# Patient Record
Sex: FEMALE | Race: WHITE | NOT HISPANIC OR LATINO
[De-identification: names, ages, dates, MRNs, and addresses within clinical notes are randomized per-mention and may not be internally consistent; named-entity substitution may affect disease eponyms.]

---

## 2017-04-12 ENCOUNTER — APPOINTMENT (OUTPATIENT)
Dept: DERMATOLOGY | Facility: CLINIC | Age: 57
End: 2017-04-12

## 2017-04-12 DIAGNOSIS — J30.1 ALLERGIC RHINITIS DUE TO POLLEN: ICD-10-CM

## 2017-04-12 DIAGNOSIS — L23.7 ALLERGIC CONTACT DERMATITIS DUE TO PLANTS, EXCEPT FOOD: ICD-10-CM

## 2017-04-12 DIAGNOSIS — Z87.39 PERSONAL HISTORY OF OTHER DISEASES OF THE MUSCULOSKELETAL SYSTEM AND CONNECTIVE TISSUE: ICD-10-CM

## 2017-04-12 RX ORDER — CEVIMELINE HYDROCHLORIDE 30 MG/1
30 CAPSULE ORAL
Qty: 90 | Refills: 0 | Status: ACTIVE | COMMUNITY
Start: 2016-11-22

## 2018-04-03 ENCOUNTER — OUTPATIENT (OUTPATIENT)
Dept: OUTPATIENT SERVICES | Facility: HOSPITAL | Age: 58
LOS: 1 days | End: 2018-04-03
Payer: COMMERCIAL

## 2018-04-03 ENCOUNTER — APPOINTMENT (OUTPATIENT)
Dept: CT IMAGING | Facility: CLINIC | Age: 58
End: 2018-04-03
Payer: COMMERCIAL

## 2018-04-03 DIAGNOSIS — Z00.8 ENCOUNTER FOR OTHER GENERAL EXAMINATION: ICD-10-CM

## 2018-04-03 PROCEDURE — 71250 CT THORAX DX C-: CPT

## 2018-04-03 PROCEDURE — 71250 CT THORAX DX C-: CPT | Mod: 26

## 2019-10-14 ENCOUNTER — APPOINTMENT (OUTPATIENT)
Dept: ORTHOPEDIC SURGERY | Facility: CLINIC | Age: 59
End: 2019-10-14
Payer: COMMERCIAL

## 2019-10-14 VITALS — BODY MASS INDEX: 26.5 KG/M2 | WEIGHT: 135 LBS | HEIGHT: 60 IN

## 2019-10-14 DIAGNOSIS — M41.9 SCOLIOSIS, UNSPECIFIED: ICD-10-CM

## 2019-10-14 DIAGNOSIS — M54.5 LOW BACK PAIN: ICD-10-CM

## 2019-10-14 DIAGNOSIS — M51.36 OTHER INTERVERTEBRAL DISC DEGENERATION, LUMBAR REGION: ICD-10-CM

## 2019-10-14 PROCEDURE — 72100 X-RAY EXAM L-S SPINE 2/3 VWS: CPT

## 2019-10-14 NOTE — DISCUSSION/SUMMARY
[de-identified] : lumbar degenerative disc disease\par discussed options\par PT and patient wants MRI lumbar\par F/U after MRI\par lumbar brochure provided\par All options were discussed including rest, medicine, chiropractor, acupuncture, , PT , pain management, and last resort surgery. \par All questions were answered, all alternatives were discussed and the patient is in complete agreement with that plan. Follow-up appointment as instructed. Any issues and the patient will call or come in sooner.\par

## 2019-10-14 NOTE — HISTORY OF PRESENT ILLNESS
[Stable] : stable [de-identified] : 59 year old female\par LBP with scoliosis.\par Has had LBP since late 2017 \par in 30's found out she has degenerative disc disease \par Has not done injections for the pain. has undergone PT course in 2006 without relief\par CT scan for pelvic pain 12/2018\par no fever, chills, sweats, nausea vomiting no bowel movement or bladder dysfunction, no recent weight loss or gain no night pain. the history is in addition to the intake form i personally reviewed.\par

## 2019-10-14 NOTE — PHYSICAL EXAM
[Normal] : Gait: normal [SLR] : negative straight leg raise [de-identified] : 5 out of 5 motor strength, sensation is intact and symmetrical full range of motion flexion extension and rotation, no palpatory tenderness full range of motion of hips knees shoulders and elbows, no atrophy, negative straight leg raise, no pathological reflexes, no swelling, normal ambulation, no apparent distress, skin is intact, no palpable lymph nodes, no upper or lower extremity instability, alert and oriented x3 and normal mood. normal finger to nose test.\par  [de-identified] : AP/lat lumbar-scoliosis and degenerative changes-reviewed with the patient.

## 2019-10-14 NOTE — ADDENDUM
[FreeTextEntry1] : I, Forrest Holguin, acted solely as a scribe for Dr. Zelaya on 10/14/2019  .\par  \par All medical record entries made by the scribe were at my, Dr. Zelaya, direction and personally dictated by me on 10/14/2019. I have reviewed the chart and agree that the record accurately reflects my personal performance of the history, physical exam, assessment and plan. I have also personally directed, reviewed, and agreed with the chart.\par

## 2020-12-31 ENCOUNTER — APPOINTMENT (OUTPATIENT)
Dept: OTOLARYNGOLOGY | Facility: CLINIC | Age: 60
End: 2020-12-31
Payer: COMMERCIAL

## 2020-12-31 VITALS
SYSTOLIC BLOOD PRESSURE: 151 MMHG | WEIGHT: 135 LBS | BODY MASS INDEX: 26.5 KG/M2 | DIASTOLIC BLOOD PRESSURE: 93 MMHG | HEIGHT: 60 IN | TEMPERATURE: 98.3 F | HEART RATE: 92 BPM

## 2020-12-31 PROCEDURE — 99072 ADDL SUPL MATRL&STAF TM PHE: CPT

## 2020-12-31 PROCEDURE — 99204 OFFICE O/P NEW MOD 45 MIN: CPT

## 2020-12-31 NOTE — REVIEW OF SYSTEMS
[Ear Pain] : ear pain [Dizziness] : dizziness [Vertigo] : vertigo [Lightheadedness] : lightheadedness [Nose Bleeds] : nose bleeds [Problem Snoring] : problem snoring [Sinus Pain] : sinus pain [Sinus Pressure] : sinus pressure [Sense Of Smell Problem] : sense of smell problem [Swelling Face] : face swelling [Heartburn] : heartburn [Joint Pain] : joint pain [Anxiety] : anxiety [Swollen Glands] : swollen glands [Negative] : Endocrine [Patient Intake Form Reviewed] : Patient intake form was reviewed

## 2020-12-31 NOTE — ASSESSMENT
[FreeTextEntry1] : acute parotitis\par question of prior swelling parotid\par complete augmentin\par pred 10 #20\par US parotid\par fu 2 weeks

## 2020-12-31 NOTE — HISTORY OF PRESENT ILLNESS
[de-identified] : 10 days rt check swelling and painful\par to urgent care rx augmentin\par hx sjogrens and oral sicca w mild gland swelling

## 2021-01-01 ENCOUNTER — TRANSCRIPTION ENCOUNTER (OUTPATIENT)
Age: 61
End: 2021-01-01

## 2021-01-02 ENCOUNTER — OUTPATIENT (OUTPATIENT)
Dept: OUTPATIENT SERVICES | Facility: HOSPITAL | Age: 61
LOS: 1 days | End: 2021-01-02
Payer: COMMERCIAL

## 2021-01-02 ENCOUNTER — APPOINTMENT (OUTPATIENT)
Dept: ULTRASOUND IMAGING | Facility: CLINIC | Age: 61
End: 2021-01-02
Payer: COMMERCIAL

## 2021-01-02 DIAGNOSIS — Z00.8 ENCOUNTER FOR OTHER GENERAL EXAMINATION: ICD-10-CM

## 2021-01-02 PROCEDURE — 76536 US EXAM OF HEAD AND NECK: CPT | Mod: 26

## 2021-01-02 PROCEDURE — 76536 US EXAM OF HEAD AND NECK: CPT

## 2021-01-05 ENCOUNTER — NON-APPOINTMENT (OUTPATIENT)
Age: 61
End: 2021-01-05

## 2021-01-21 ENCOUNTER — NON-APPOINTMENT (OUTPATIENT)
Age: 61
End: 2021-01-21

## 2021-01-21 ENCOUNTER — APPOINTMENT (OUTPATIENT)
Dept: OTOLARYNGOLOGY | Facility: CLINIC | Age: 61
End: 2021-01-21
Payer: COMMERCIAL

## 2021-01-21 VITALS — BODY MASS INDEX: 26.5 KG/M2 | HEIGHT: 60 IN | TEMPERATURE: 98.1 F | WEIGHT: 135 LBS

## 2021-01-21 DIAGNOSIS — M35.00 SICCA SYNDROME, UNSPECIFIED: ICD-10-CM

## 2021-01-21 DIAGNOSIS — K11.21 ACUTE SIALOADENITIS: ICD-10-CM

## 2021-01-21 PROCEDURE — 99213 OFFICE O/P EST LOW 20 MIN: CPT

## 2021-01-21 PROCEDURE — 99072 ADDL SUPL MATRL&STAF TM PHE: CPT

## 2021-01-21 NOTE — PHYSICAL EXAM
[Midline] : trachea located in midline position [Normal] : salivary glands are normal [de-identified] : mild soft swelling rt parotid good flow saliva howard duct

## 2021-01-21 NOTE — ASSESSMENT
[FreeTextEntry1] : oral sicca\par resolved parotitis\par sjogrens\par trial pilocarpine 5 mg qid prn

## 2021-12-30 ENCOUNTER — OUTPATIENT (OUTPATIENT)
Dept: OUTPATIENT SERVICES | Facility: HOSPITAL | Age: 61
LOS: 1 days | End: 2021-12-30
Payer: COMMERCIAL

## 2021-12-30 ENCOUNTER — APPOINTMENT (OUTPATIENT)
Dept: ULTRASOUND IMAGING | Facility: CLINIC | Age: 61
End: 2021-12-30
Payer: COMMERCIAL

## 2021-12-30 ENCOUNTER — APPOINTMENT (OUTPATIENT)
Dept: MAMMOGRAPHY | Facility: CLINIC | Age: 61
End: 2021-12-30
Payer: COMMERCIAL

## 2021-12-30 DIAGNOSIS — R92.2 INCONCLUSIVE MAMMOGRAM: ICD-10-CM

## 2021-12-30 DIAGNOSIS — Z12.31 ENCOUNTER FOR SCREENING MAMMOGRAM FOR MALIGNANT NEOPLASM OF BREAST: ICD-10-CM

## 2021-12-30 DIAGNOSIS — Z00.8 ENCOUNTER FOR OTHER GENERAL EXAMINATION: ICD-10-CM

## 2021-12-30 PROCEDURE — 77063 BREAST TOMOSYNTHESIS BI: CPT

## 2021-12-30 PROCEDURE — 77067 SCR MAMMO BI INCL CAD: CPT

## 2021-12-30 PROCEDURE — 77067 SCR MAMMO BI INCL CAD: CPT | Mod: 26

## 2021-12-30 PROCEDURE — 76641 ULTRASOUND BREAST COMPLETE: CPT | Mod: 26,50

## 2021-12-30 PROCEDURE — 77063 BREAST TOMOSYNTHESIS BI: CPT | Mod: 26

## 2021-12-30 PROCEDURE — 76641 ULTRASOUND BREAST COMPLETE: CPT

## 2022-02-01 ENCOUNTER — APPOINTMENT (OUTPATIENT)
Dept: ULTRASOUND IMAGING | Facility: CLINIC | Age: 62
End: 2022-02-01
Payer: COMMERCIAL

## 2022-02-01 ENCOUNTER — OUTPATIENT (OUTPATIENT)
Dept: OUTPATIENT SERVICES | Facility: HOSPITAL | Age: 62
LOS: 1 days | End: 2022-02-01
Payer: COMMERCIAL

## 2022-02-01 ENCOUNTER — APPOINTMENT (OUTPATIENT)
Dept: MAMMOGRAPHY | Facility: CLINIC | Age: 62
End: 2022-02-01
Payer: COMMERCIAL

## 2022-02-01 DIAGNOSIS — Z00.8 ENCOUNTER FOR OTHER GENERAL EXAMINATION: ICD-10-CM

## 2022-02-01 PROCEDURE — 77065 DX MAMMO INCL CAD UNI: CPT | Mod: 26,RT

## 2022-02-01 PROCEDURE — 76700 US EXAM ABDOM COMPLETE: CPT | Mod: 26

## 2022-02-01 PROCEDURE — 76700 US EXAM ABDOM COMPLETE: CPT

## 2022-02-01 PROCEDURE — 76642 ULTRASOUND BREAST LIMITED: CPT | Mod: 26,RT

## 2022-02-01 PROCEDURE — 76642 ULTRASOUND BREAST LIMITED: CPT

## 2022-02-01 PROCEDURE — 77065 DX MAMMO INCL CAD UNI: CPT

## 2022-02-22 ENCOUNTER — APPOINTMENT (OUTPATIENT)
Dept: MRI IMAGING | Facility: CLINIC | Age: 62
End: 2022-02-22

## 2022-02-24 ENCOUNTER — APPOINTMENT (OUTPATIENT)
Dept: MRI IMAGING | Facility: CLINIC | Age: 62
End: 2022-02-24
Payer: COMMERCIAL

## 2022-02-24 ENCOUNTER — OUTPATIENT (OUTPATIENT)
Dept: OUTPATIENT SERVICES | Facility: HOSPITAL | Age: 62
LOS: 1 days | End: 2022-02-24
Payer: COMMERCIAL

## 2022-02-24 DIAGNOSIS — Z00.8 ENCOUNTER FOR OTHER GENERAL EXAMINATION: ICD-10-CM

## 2022-02-24 PROCEDURE — A9585: CPT

## 2022-02-24 PROCEDURE — 74183 MRI ABD W/O CNTR FLWD CNTR: CPT | Mod: 26

## 2022-02-24 PROCEDURE — 74183 MRI ABD W/O CNTR FLWD CNTR: CPT

## 2022-06-09 ENCOUNTER — APPOINTMENT (OUTPATIENT)
Dept: ORTHOPEDIC SURGERY | Facility: CLINIC | Age: 62
End: 2022-06-09

## 2022-06-09 DIAGNOSIS — S52.122A DISPLACED FRACTURE OF HEAD OF LEFT RADIUS, INITIAL ENCOUNTER FOR CLOSED FRACTURE: ICD-10-CM

## 2022-06-09 PROCEDURE — 73080 X-RAY EXAM OF ELBOW: CPT | Mod: LT

## 2022-06-09 PROCEDURE — 24650 CLTX RDL HEAD/NCK FX WO MNPJ: CPT | Mod: LT

## 2022-06-09 PROCEDURE — 99204 OFFICE O/P NEW MOD 45 MIN: CPT | Mod: 1L,57

## 2022-06-09 PROCEDURE — 73110 X-RAY EXAM OF WRIST: CPT | Mod: RT

## 2022-06-24 ENCOUNTER — INPATIENT (INPATIENT)
Facility: HOSPITAL | Age: 62
LOS: 0 days | Discharge: HOME | DRG: 502 | End: 2022-06-24
Attending: ORTHOPAEDIC SURGERY | Admitting: ORTHOPAEDIC SURGERY
Payer: COMMERCIAL

## 2022-06-24 ENCOUNTER — APPOINTMENT (OUTPATIENT)
Dept: ORTHOPEDIC SURGERY | Facility: CLINIC | Age: 62
End: 2022-06-24

## 2022-06-24 ENCOUNTER — TRANSCRIPTION ENCOUNTER (OUTPATIENT)
Age: 62
End: 2022-06-24

## 2022-06-24 ENCOUNTER — RESULT REVIEW (OUTPATIENT)
Age: 62
End: 2022-06-24

## 2022-06-24 VITALS — HEIGHT: 61 IN | WEIGHT: 125 LBS

## 2022-06-24 VITALS
OXYGEN SATURATION: 96 % | DIASTOLIC BLOOD PRESSURE: 71 MMHG | RESPIRATION RATE: 16 BRPM | HEART RATE: 75 BPM | SYSTOLIC BLOOD PRESSURE: 131 MMHG

## 2022-06-24 DIAGNOSIS — S42.409A UNSPECIFIED FRACTURE OF LOWER END OF UNSPECIFIED HUMERUS, INITIAL ENCOUNTER FOR CLOSED FRACTURE: ICD-10-CM

## 2022-06-24 DIAGNOSIS — S52.122A DISPLACED FRACTURE OF HEAD OF LEFT RADIUS, INITIAL ENCOUNTER FOR CLOSED FRACTURE: ICD-10-CM

## 2022-06-24 LAB
ANION GAP SERPL CALC-SCNC: 5 MMOL/L — SIGNIFICANT CHANGE UP (ref 5–17)
APTT BLD: 32.9 SEC — SIGNIFICANT CHANGE UP (ref 27.5–35.5)
BUN SERPL-MCNC: 24 MG/DL — HIGH (ref 7–23)
CALCIUM SERPL-MCNC: 9.2 MG/DL — SIGNIFICANT CHANGE UP (ref 8.5–10.1)
CHLORIDE SERPL-SCNC: 110 MMOL/L — HIGH (ref 96–108)
CO2 SERPL-SCNC: 26 MMOL/L — SIGNIFICANT CHANGE UP (ref 22–31)
CREAT SERPL-MCNC: 0.93 MG/DL — SIGNIFICANT CHANGE UP (ref 0.5–1.3)
EGFR: 69 ML/MIN/1.73M2 — SIGNIFICANT CHANGE UP
GLUCOSE SERPL-MCNC: 88 MG/DL — SIGNIFICANT CHANGE UP (ref 70–99)
HCG SERPL-ACNC: 4 MIU/ML — SIGNIFICANT CHANGE UP
HCT VFR BLD CALC: 42.4 % — SIGNIFICANT CHANGE UP (ref 34.5–45)
HGB BLD-MCNC: 13.7 G/DL — SIGNIFICANT CHANGE UP (ref 11.5–15.5)
INR BLD: 1.06 RATIO — SIGNIFICANT CHANGE UP (ref 0.88–1.16)
MCHC RBC-ENTMCNC: 28.7 PG — SIGNIFICANT CHANGE UP (ref 27–34)
MCHC RBC-ENTMCNC: 32.3 GM/DL — SIGNIFICANT CHANGE UP (ref 32–36)
MCV RBC AUTO: 88.7 FL — SIGNIFICANT CHANGE UP (ref 80–100)
PLATELET # BLD AUTO: 175 K/UL — SIGNIFICANT CHANGE UP (ref 150–400)
POTASSIUM SERPL-MCNC: 4.1 MMOL/L — SIGNIFICANT CHANGE UP (ref 3.5–5.3)
POTASSIUM SERPL-SCNC: 4.1 MMOL/L — SIGNIFICANT CHANGE UP (ref 3.5–5.3)
PROTHROM AB SERPL-ACNC: 12.3 SEC — SIGNIFICANT CHANGE UP (ref 10.5–13.4)
RBC # BLD: 4.78 M/UL — SIGNIFICANT CHANGE UP (ref 3.8–5.2)
RBC # FLD: 12.4 % — SIGNIFICANT CHANGE UP (ref 10.3–14.5)
SARS-COV-2 RNA SPEC QL NAA+PROBE: SIGNIFICANT CHANGE UP
SODIUM SERPL-SCNC: 141 MMOL/L — SIGNIFICANT CHANGE UP (ref 135–145)
WBC # BLD: 6.37 K/UL — SIGNIFICANT CHANGE UP (ref 3.8–10.5)
WBC # FLD AUTO: 6.37 K/UL — SIGNIFICANT CHANGE UP (ref 3.8–10.5)

## 2022-06-24 PROCEDURE — 76000 FLUOROSCOPY <1 HR PHYS/QHP: CPT

## 2022-06-24 PROCEDURE — 73090 X-RAY EXAM OF FOREARM: CPT | Mod: LT

## 2022-06-24 PROCEDURE — 73070 X-RAY EXAM OF ELBOW: CPT | Mod: 26,LT

## 2022-06-24 PROCEDURE — 93010 ELECTROCARDIOGRAM REPORT: CPT

## 2022-06-24 PROCEDURE — 73110 X-RAY EXAM OF WRIST: CPT | Mod: 26,LT

## 2022-06-24 PROCEDURE — 88307 TISSUE EXAM BY PATHOLOGIST: CPT | Mod: 26

## 2022-06-24 PROCEDURE — 88307 TISSUE EXAM BY PATHOLOGIST: CPT

## 2022-06-24 PROCEDURE — 88311 DECALCIFY TISSUE: CPT

## 2022-06-24 PROCEDURE — 99285 EMERGENCY DEPT VISIT HI MDM: CPT

## 2022-06-24 PROCEDURE — 71045 X-RAY EXAM CHEST 1 VIEW: CPT | Mod: 26

## 2022-06-24 PROCEDURE — 73200 CT UPPER EXTREMITY W/O DYE: CPT | Mod: 26,LT,MA

## 2022-06-24 PROCEDURE — 71045 X-RAY EXAM CHEST 1 VIEW: CPT

## 2022-06-24 PROCEDURE — 88311 DECALCIFY TISSUE: CPT | Mod: 26

## 2022-06-24 PROCEDURE — 73090 X-RAY EXAM OF FOREARM: CPT | Mod: 26,LT

## 2022-06-24 PROCEDURE — C1776: CPT

## 2022-06-24 PROCEDURE — 76376 3D RENDER W/INTRP POSTPROCES: CPT | Mod: 26

## 2022-06-24 PROCEDURE — 73070 X-RAY EXAM OF ELBOW: CPT | Mod: LT

## 2022-06-24 PROCEDURE — 73110 X-RAY EXAM OF WRIST: CPT | Mod: LT

## 2022-06-24 RX ORDER — ACETAMINOPHEN 500 MG
650 TABLET ORAL EVERY 6 HOURS
Refills: 0 | Status: DISCONTINUED | OUTPATIENT
Start: 2022-06-24 | End: 2022-06-24

## 2022-06-24 RX ORDER — OXYCODONE HYDROCHLORIDE 5 MG/1
5 TABLET ORAL EVERY 4 HOURS
Refills: 0 | Status: DISCONTINUED | OUTPATIENT
Start: 2022-06-24 | End: 2022-06-24

## 2022-06-24 RX ORDER — FENTANYL CITRATE 50 UG/ML
50 INJECTION INTRAVENOUS
Refills: 0 | Status: DISCONTINUED | OUTPATIENT
Start: 2022-06-24 | End: 2022-06-24

## 2022-06-24 RX ORDER — OXYCODONE HYDROCHLORIDE 5 MG/1
10 TABLET ORAL EVERY 4 HOURS
Refills: 0 | Status: DISCONTINUED | OUTPATIENT
Start: 2022-06-24 | End: 2022-06-24

## 2022-06-24 RX ORDER — OXYCODONE AND ACETAMINOPHEN 5; 325 MG/1; MG/1
1 TABLET ORAL
Qty: 30 | Refills: 0
Start: 2022-06-24

## 2022-06-24 RX ORDER — ONDANSETRON 8 MG/1
1 TABLET, FILM COATED ORAL
Qty: 21 | Refills: 0
Start: 2022-06-24 | End: 2022-06-30

## 2022-06-24 RX ORDER — SODIUM CHLORIDE 9 MG/ML
1000 INJECTION, SOLUTION INTRAVENOUS
Refills: 0 | Status: DISCONTINUED | OUTPATIENT
Start: 2022-06-24 | End: 2022-06-24

## 2022-06-24 RX ORDER — OXYCODONE HYDROCHLORIDE 5 MG/1
5 TABLET ORAL ONCE
Refills: 0 | Status: DISCONTINUED | OUTPATIENT
Start: 2022-06-24 | End: 2022-06-24

## 2022-06-24 RX ORDER — DOCUSATE SODIUM 100 MG
1 CAPSULE ORAL
Qty: 21 | Refills: 0
Start: 2022-06-24 | End: 2022-06-30

## 2022-06-24 RX ORDER — ONDANSETRON 8 MG/1
4 TABLET, FILM COATED ORAL EVERY 6 HOURS
Refills: 0 | Status: DISCONTINUED | OUTPATIENT
Start: 2022-06-24 | End: 2022-06-24

## 2022-06-24 RX ADMIN — FENTANYL CITRATE 50 MICROGRAM(S): 50 INJECTION INTRAVENOUS at 18:19

## 2022-06-24 RX ADMIN — ONDANSETRON 4 MILLIGRAM(S): 8 TABLET, FILM COATED ORAL at 20:01

## 2022-06-24 RX ADMIN — OXYCODONE HYDROCHLORIDE 5 MILLIGRAM(S): 5 TABLET ORAL at 18:44

## 2022-06-24 RX ADMIN — SODIUM CHLORIDE 75 MILLILITER(S): 9 INJECTION, SOLUTION INTRAVENOUS at 12:40

## 2022-06-24 NOTE — ASU DISCHARGE PLAN (ADULT/PEDIATRIC) - CARE PROVIDER_API CALL
Blaine Rodriguez)  Orthopaedic Surgery; Surgery of the Hand  [address] [place] [address]  Phone: [phone]  Fax: [phone]  Follow Up Time:

## 2022-06-24 NOTE — ED STATDOCS - PHYSICAL EXAMINATION
Constitutional: NAD AOx3  Eyes: PERRL EOMI  Head: Normocephalic atraumatic  Mouth: MMM  Cardiac: regular rate and rhythm  Resp: Lungs CTAB  GI: Abd s/nd/nt  Neuro: CN2-12 grossly intact, THOMPSON x 4  MSK: left hand has mild edema, left elbow TPP with decreased range of motion secondary to pain.   Skin: No visible rashes

## 2022-06-24 NOTE — H&P ADULT - ATTENDING COMMENTS
Patient seen and evaluated. Has significant left elbow fracture and ligamentous injury requiring immediate surgical repair.  Risks/benefits/alternatives reviewed. No guarantees or reassurances made or implied. Need for additional possible future procedures reviewed.

## 2022-06-24 NOTE — ED STATDOCS - OBJECTIVE STATEMENT
61 y/o F with PMHx of Sjogren's, presents to ED c/o elbow injury. Pt with fall on 05/27 walking to her car, broke her left elbow. Followed up with dr townsend [facility] this week ,scheduled for surgery today. Pt is right handed. Pt is covid vaccinated. Only complaints of left elbow pain. 61 y/o F with PMHx of Sjogren's, presents to ED c/o elbow injury. Pt with fall on 05/27 walking to her car, broke her left elbow. Followed up with dr townsend [facility] this week ,scheduled for surgery today. Pt is right handed. Pt is covid vaccinated. Only complaining of left elbow pain.

## 2022-06-24 NOTE — ED STATDOCS - NSICDXNOPASTSURGICALHX_GEN_ALL_ED
<-- Click to add NO significant Past Surgical History
Normal vision: sees adequately in most situations; can see medication labels, newsprint

## 2022-06-24 NOTE — ED STATDOCS - NS ED ROS FT
Constitutional: No fever or chills  Eyes: No visual changes  HEENT: No throat pain  CV: No chest pain  Resp: No SOB no cough  GI: No abd pain, nausea or vomiting  : No dysuria  MSK: +musculoskeletal pain, +left elbow pain  Skin: No rash  Neuro: No headache

## 2022-06-24 NOTE — ASU DISCHARGE PLAN (ADULT/PEDIATRIC) - ASU DC SPECIAL INSTRUCTIONSFT
Discharge Instructions     1. Activity: No Aggressive ROM until cleared by Dr. Rodriguez Maintain sling . Elevate hand and wiggle fingers. Do not start any outpatient PT until you see Dr. Rodriguez in the office.    2. Call with fever over 101, wound redness, drainage.    3. Wound care: Do not remove or change dressing unless saturated.  IF so, apply dry gauze, tegaderm. There are no sutures or staples to remove.    4. Continue to apply ICE packs.    6. Follow Up: Orthopedics, Dr. Rodriguez in office. Call to schedule. eRx sent to your pharmacy for .

## 2022-06-24 NOTE — DISCHARGE NOTE PROVIDER - HOSPITAL COURSE
The patient is a 62y Female status post left radial head replacement. Patient presented to [facility] for surgical procedure. The patient was taken to the operating room on 6/24/22. Prophylactic antibiotics were started before the procedure and continued for 24 hours. There were no complications during the procedure and patient tolerated the procedure well. The patient was transferred to the recovery room in stable condition. Patient was discharged from pacu to home in stable condition.

## 2022-06-24 NOTE — DISCHARGE NOTE PROVIDER - NSDCFUADDINST_GEN_ALL_CORE_FT
1. Activity: No Aggressive ROM until cleared by Dr. Rodriguez Maintain sling . Elevate hand and wiggle fingers. Do not start any outpatient PT until you see Dr. Rodriguez in the office.    2. Call with fever over 101, wound redness, drainage.    3. Wound care: Do not remove or change dressing unless saturated.  IF so, apply dry gauze, tegaderm. There are no sutures or staples to remove.    4. Continue to apply ICE packs.    6. Follow Up: Orthopedics, Dr. Rodriguez in office. Call to schedule. eRx sent to your pharmacy for .

## 2022-06-24 NOTE — DISCHARGE NOTE PROVIDER - NSDCFUSCHEDAPPT_GEN_ALL_CORE_FT
[facility]  Rad [facility] [address]  Scheduled Appointment: 06/28/2022    [facility]  Rad  [address]  Scheduled Appointment: 06/28/2022

## 2022-06-24 NOTE — ED STATDOCS - NS ED ATTENDING STATEMENT MOD
I have seen and examined this patient and fully participated in the care of this patient as the teaching attending.  The service was shared with the MATILDE.  I reviewed and verified the documentation and independently performed the documented:

## 2022-06-24 NOTE — ED ADULT NURSE NOTE - OBJECTIVE STATEMENT
Pt arrived for surgery to left elbow .Pt was send by Dr Rodriguez.Pt fell 5 weeks ago and fractured her elbow.

## 2022-06-24 NOTE — ED STATDOCS - CLINICAL SUMMARY MEDICAL DECISION MAKING FREE TEXT BOX
plan: pre-op labs, x-ray. plan: pre-op labs, x-ray.            63 y/o F with PMHx of Sjogren's, presents to ED c/o elbow injury. Pt with fall on 05/27 walking to her car, broke her left elbow. Followed up with dr townsend orth[facility] this week ,scheduled for surgery today. Pt is right handed. Pt is covid vaccinated. Only complaining of left elbow pain.

## 2022-06-24 NOTE — DISCHARGE NOTE PROVIDER - CARE PROVIDER_API CALL
Blaine Rodriguez)  Orthopaedic Surgery; Surgery of the Hand  [address] [place] [address]  Phone: [phone]  Fax: [phone]  Follow Up Time:

## 2022-06-24 NOTE — H&P ADULT - HISTORY OF PRESENT ILLNESS
62y R Hand Dominant. Female patient presents to [place] ED c/o severe left elbow pain s/p mechanical fall on [facility]. Sent in by Dr. Rodriguez for radial head repair v replacement. Patient denies head hit or LOC. Localizing pain to elbow radius. Denies radiation of pain. Pt endorses stiffness of the elbow and pain.Denies numbness, tingling or burning. Patient denies any other injuries.    Meds: See med rec    T(C): 37.4 (06-24-22 @ 10:49)  HR: 78 (06-24-22 @ 10:49)  BP: 153/92 (06-24-22 @ 10:49)  RR: 18 (06-24-22 @ 10:49)  SpO2: 99% (06-24-22 @ 10:49)  Wt(kg): --    PE :  Gen: NAD, A/Ox3, Following commands    L UE:  in sling   Skin intact,  swelling of the elbow, + soft tissue swelling  Decreased ROM of elbow 2/2 pain  Normal wrist and shoulder ROM w/o pain  + TTP over elbow  No Snuff box TTP  + Rad Pulse   SILT C5-T1  +AIN/PIN/Ulnar/Radial/Musc/Median  compartments soft and compressible    Secondary Assessment:  NC/AT, NTTP of clavicles, NTTP of C-,T-,L-Spine, NTTP of Pelvis  RUE: NTTP of Shoulder, Elbow, Wrist, Hand; NT with AROM/PROM of Shoulder, Elbow, Wrist, Hand; AIN/PIN/Med/Uln/Msc/Rad/Ax intact  LEs: Able to SLR, NT with Log Roll, NT with Heel Strike, NTTP of Hips, Knees, Ankles, Feet; NT with AROM/PROM of Hips, Knees, Ankles, Feet; Q/H/Gsc/TA/EHL/FHL intact    V exam unchanged. Pt tolerated procedure well.    A/P:   62[facility] going for operative fixation today .    -Pt advised to remove all jewelry from affected limb.  -Pain control  -Strict Ice/Elevation to help with swelling  -NWB LUE sling  -preop labs/ekg./cxr  -NPO IVF  -Plan for OR today   -CT Left elbow  -XR left elbow forearm and wrist  -plan of care discussed with patient who voiced full understanding and agreement   -Imaging and clinical presentation discussed w/ Dr. Rodriguez who is aware and agrees w/ above plan.       62y R Hand Dominant. Female patient presents to [place] ED c/o severe left elbow pain s/p mechanical fall on memorial weekend.  Patient denies head hit or LOC. Localizing pain to elbow radius. Denies radiation of pain. Pt endorses stiffness of the elbow and pain.Denies numbness, tingling or burning. Patient denies any other injuries.    Meds: See med rec    T(C): 37.4 (06-24-22 @ 10:49)  HR: 78 (06-24-22 @ 10:49)  BP: 153/92 (06-24-22 @ 10:49)  RR: 18 (06-24-22 @ 10:49)  SpO2: 99% (06-24-22 @ 10:49)  Wt(kg): --    PE :  Gen: NAD, A/Ox3, Following commands    L UE:  in sling   Skin intact,  swelling of the elbow, + soft tissue swelling  Decreased ROM of elbow 2/2 pain  Normal wrist and shoulder ROM w/o pain  + TTP over elbow  No Snuff box TTP  + Rad Pulse   SILT C5-T1  +AIN/PIN/Ulnar/Radial/Musc/Median  compartments soft and compressible    Secondary Assessment:  NC/AT, NTTP of clavicles, NTTP of C-,T-,L-Spine, NTTP of Pelvis  RUE: NTTP of Shoulder, Elbow, Wrist, Hand; NT with AROM/PROM of Shoulder, Elbow, Wrist, Hand; AIN/PIN/Med/Uln/Msc/Rad/Ax intact  LEs: Able to SLR, NT with Log Roll, NT with Heel Strike, NTTP of Hips, Knees, Ankles, Feet; NT with AROM/PROM of Hips, Knees, Ankles, Feet; Q/H/Gsc/TA/EHL/FHL intact    V exam unchanged. Pt tolerated procedure well.    A/P:   62yFemale s/p [facility] Fall Left radial head fracture going for operative fixation today .    -Pt advised to remove all jewelry from affected limb.  -Pain control  -Strict Ice/Elevation to help with swelling  -NWB LUE sling  -preop labs/ekg./cxr  -NPO IVF  -Plan for OR today   -CT Left elbow  -XR left elbow forearm and wrist  -plan of care discussed with patient who voiced full understanding and agreement   -Imaging and clinical presentation discussed w/ Dr. Rodriguez who is aware and agrees w/ above plan.

## 2022-06-24 NOTE — ED STATDOCS - PROGRESS NOTE DETAILS
Admitted to Dr. Rodriguez.  Megan Ribeiro PA-C Pt. is a 62 year old female presenting for left elbow injury.  Sent in by Dr. Rodriguez for OR this afternoon.  Pt. broke elbow 5/27.  Right hand dominant.  Megan Ribeiro PA-C

## 2022-06-24 NOTE — ASU DISCHARGE PLAN (ADULT/PEDIATRIC) - NS MD DC FALL RISK RISK
For information on Fall & Injury Prevention, visit: [email or web address] OR  [email or web address] OR  https://www.cdc.gov/steadi/patient.html

## 2022-06-24 NOTE — DISCHARGE NOTE PROVIDER - NSDCMRMEDTOKEN_GEN_ALL_CORE_FT
cevimeline 30 mg oral capsule: 1 cap(s) orally 3 times a day  ***pt can bring own***  Colace 100 mg oral capsule: 1 cap(s) orally 3 times a day   oxycodone-acetaminophen 5 mg-325 mg oral tablet: 1 tab(s) orally every 6 hours MDD:4 tablets  Vitamin D3 25 mcg (1000 intl units) oral tablet: 1 tab(s) orally once a day  Zofran 4 mg oral tablet: 1 tab(s) orally 3 times a day

## 2022-06-27 PROBLEM — M35.00 SJOGREN SYNDROME, UNSPECIFIED: Chronic | Status: ACTIVE | Noted: 2022-06-24
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
## 2022-06-30 DIAGNOSIS — M35.00 SJOGREN SYNDROME, UNSPECIFIED: ICD-10-CM

## 2022-06-30 DIAGNOSIS — S52.122A DISPLACED FRACTURE OF HEAD OF LEFT RADIUS, INITIAL ENCOUNTER FOR CLOSED FRACTURE: ICD-10-CM

## 2022-06-30 DIAGNOSIS — S52.132A DISPLACED FRACTURE OF NECK OF LEFT RADIUS, INITIAL ENCOUNTER FOR CLOSED FRACTURE: ICD-10-CM

## 2022-06-30 DIAGNOSIS — Z91.013 ALLERGY TO SEAFOOD: ICD-10-CM
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
## 2022-08-17 ENCOUNTER — OUTPATIENT (OUTPATIENT)
Dept: OUTPATIENT SERVICES | Facility: HOSPITAL | Age: 62
LOS: 1 days | End: 2022-08-17
Payer: COMMERCIAL

## 2022-08-17 ENCOUNTER — APPOINTMENT (OUTPATIENT)
Dept: ULTRASOUND IMAGING | Facility: CLINIC | Age: 62
End: 2022-08-17

## 2022-08-17 ENCOUNTER — APPOINTMENT (OUTPATIENT)
Dept: MAMMOGRAPHY | Facility: CLINIC | Age: 62
End: 2022-08-17

## 2022-08-17 DIAGNOSIS — R92.2 INCONCLUSIVE MAMMOGRAM: ICD-10-CM

## 2022-08-17 PROCEDURE — 77065 DX MAMMO INCL CAD UNI: CPT | Mod: 26,RT

## 2022-08-17 PROCEDURE — G0279: CPT | Mod: 26

## 2022-08-17 PROCEDURE — 76642 ULTRASOUND BREAST LIMITED: CPT | Mod: 26,RT

## 2022-08-17 PROCEDURE — 76642 ULTRASOUND BREAST LIMITED: CPT

## 2022-08-17 PROCEDURE — G0279: CPT

## 2022-08-17 PROCEDURE — 77065 DX MAMMO INCL CAD UNI: CPT

## 2022-08-23 ENCOUNTER — APPOINTMENT (OUTPATIENT)
Dept: ULTRASOUND IMAGING | Facility: CLINIC | Age: 62
End: 2022-08-23

## 2022-08-23 ENCOUNTER — RESULT REVIEW (OUTPATIENT)
Age: 62
End: 2022-08-23

## 2022-08-23 ENCOUNTER — OUTPATIENT (OUTPATIENT)
Dept: OUTPATIENT SERVICES | Facility: HOSPITAL | Age: 62
LOS: 1 days | End: 2022-08-23
Payer: COMMERCIAL

## 2022-08-23 DIAGNOSIS — Z00.8 ENCOUNTER FOR OTHER GENERAL EXAMINATION: ICD-10-CM

## 2022-08-23 PROCEDURE — 19083 BX BREAST 1ST LESION US IMAG: CPT | Mod: RT

## 2022-08-23 PROCEDURE — 88360 TUMOR IMMUNOHISTOCHEM/MANUAL: CPT | Mod: 26

## 2022-08-23 PROCEDURE — 77065 DX MAMMO INCL CAD UNI: CPT | Mod: 26,RT

## 2022-08-23 PROCEDURE — A4648: CPT

## 2022-08-23 PROCEDURE — 77065 DX MAMMO INCL CAD UNI: CPT

## 2022-08-23 PROCEDURE — 88305 TISSUE EXAM BY PATHOLOGIST: CPT

## 2022-08-23 PROCEDURE — 88360 TUMOR IMMUNOHISTOCHEM/MANUAL: CPT

## 2022-08-23 PROCEDURE — 88305 TISSUE EXAM BY PATHOLOGIST: CPT | Mod: 26

## 2022-08-23 PROCEDURE — 19083 BX BREAST 1ST LESION US IMAG: CPT

## 2022-09-29 NOTE — HISTORY OF PRESENT ILLNESS
[de-identified] : KYAW Thompson is a 62-year-old woman presenting with a left wrist and elbow pain.  She brought imaging with her from 5/28/22. She had a ground-level fall with an outstretched arm on 5/27/22.  She was told she had a fracture and she is here in the office for further evaluation and discussion of treatment options.

## 2022-09-29 NOTE — PHYSICAL EXAM
[de-identified] : The patient is sitting comfortably in the exam room. \par Left arm.\par -Skin is intact, swelling, no ecchymosis\par -Tenderness to palpation medially and laterally at the elbow\par -Pain with palpation over the radial head with range of motion\par -Range of motion elbow 40-90\par -Pronation 0, supination 5\par -Stable to varus and valgus stress\par -Able to make a fist\par -Sensation is intact median, radial, ulnar, axillary nerves\par -Motor is intact median, radial, ulnar, axillary nerves\par -Hand is warm and well-perfused, Palpable radial and ulnar pulses [de-identified] : X-rays of the right wrist were taken in the office today including AP oblique and lateral.  X-rays show good overall alignment of the wrist.  There is some sclerosis at the base of the first metacarpal.  There is a well-corticated ossicle around the ulnar styloid.  No acute fractures or dislocations identified.\par \par X-rays of the left elbow were taken in the office today including AP oblique and lateral.  X-rays show a displaced fracture of the radial head.  There is a large fragment that is angulated nearly 90 degrees.

## 2022-09-29 NOTE — DISCUSSION/SUMMARY
[de-identified] : 62-year-old woman with a left radial head fracture, approximately 2 weeks out.\par \par -The risks and benefits of operative versus nonoperative management were discussed at length with the patient. The patient shows a good understanding of the injury and treatment options. At the moment they were unsure of their next steps and will make a decision with her family.  They will move forward with nonoperative management at this point in time until further notification.\par -The patient has significant decreased range of motion of the elbow in flexion and extension and limited motion of the forearm with limited pronation and supination.  I explained that there is a good chance that this motion will not return due to the large fragment that is fractured and in nonanatomic position in the elbow.\par -My surgical coordinators phone number was given to the patient in case they decide to move forward with operative management.\par -If they decide to move forward with nonoperative management I will have them return in 3 weeks with x-rays of the left elbow at that time\par -All of the patient's questions and concerns were addressed.\par

## 2022-10-26 ENCOUNTER — APPOINTMENT (OUTPATIENT)
Dept: BREAST CENTER | Facility: CLINIC | Age: 62
End: 2022-10-26

## 2022-10-26 VITALS
SYSTOLIC BLOOD PRESSURE: 136 MMHG | BODY MASS INDEX: 21.71 KG/M2 | HEART RATE: 77 BPM | DIASTOLIC BLOOD PRESSURE: 88 MMHG | WEIGHT: 115 LBS | HEIGHT: 61 IN

## 2022-10-26 DIAGNOSIS — Z80.3 FAMILY HISTORY OF MALIGNANT NEOPLASM OF BREAST: ICD-10-CM

## 2022-10-26 DIAGNOSIS — Z83.0 FAMILY HISTORY OF HUMAN IMMUNODEFICIENCY VIRUS [HIV] DISEASE: ICD-10-CM

## 2022-10-26 DIAGNOSIS — Z78.9 OTHER SPECIFIED HEALTH STATUS: ICD-10-CM

## 2022-10-26 PROCEDURE — 99205 OFFICE O/P NEW HI 60 MIN: CPT

## 2022-10-26 RX ORDER — PILOCARPINE HYDROCHLORIDE 5 MG/1
5 TABLET, FILM COATED ORAL 4 TIMES DAILY
Qty: 90 | Refills: 3 | Status: DISCONTINUED | COMMUNITY
Start: 2021-01-21 | End: 2022-10-26

## 2022-10-26 RX ORDER — INDOMETHACIN 25 MG/1
25 CAPSULE ORAL
Qty: 63 | Refills: 0 | Status: DISCONTINUED | COMMUNITY
Start: 2022-07-11

## 2022-10-26 RX ORDER — PREDNISONE 20 MG/1
20 TABLET ORAL
Qty: 24 | Refills: 0 | Status: DISCONTINUED | COMMUNITY
Start: 2017-04-12 | End: 2022-10-26

## 2022-10-26 RX ORDER — TRAMADOL HYDROCHLORIDE AND ACETAMINOPHEN 37.5; 325 MG/1; MG/1
37.5-325 TABLET, FILM COATED ORAL
Qty: 30 | Refills: 0 | Status: DISCONTINUED | COMMUNITY
Start: 2022-06-07

## 2022-10-26 RX ORDER — FLUOCINONIDE 0.5 MG/G
0.05 GEL TOPICAL TWICE DAILY
Qty: 30 | Refills: 2 | Status: DISCONTINUED | COMMUNITY
Start: 2017-04-12 | End: 2022-10-26

## 2022-10-26 RX ORDER — ONDANSETRON 4 MG/1
4 TABLET ORAL
Qty: 7 | Refills: 0 | Status: DISCONTINUED | COMMUNITY
Start: 2022-06-24

## 2022-10-26 RX ORDER — PREDNISONE 10 MG/1
10 TABLET ORAL TWICE DAILY
Qty: 15 | Refills: 2 | Status: DISCONTINUED | COMMUNITY
Start: 2020-12-31 | End: 2022-10-26

## 2022-10-26 RX ORDER — METHYLPREDNISOLONE 4 MG/1
4 TABLET ORAL
Qty: 21 | Refills: 0 | Status: DISCONTINUED | COMMUNITY
Start: 2022-07-26

## 2022-10-26 RX ORDER — OXYCODONE AND ACETAMINOPHEN 5; 325 MG/1; MG/1
5-325 TABLET ORAL
Qty: 30 | Refills: 0 | Status: DISCONTINUED | COMMUNITY
Start: 2022-06-24

## 2022-10-26 NOTE — PHYSICAL EXAM
[EOMI] : extra ocular movement intact [Sclera nonicteric] : sclera nonicteric [Supple] : supple [No Supraclavicular Adenopathy] : no supraclavicular adenopathy [No Cervical Adenopathy] : no cervical adenopathy [No Thyromegaly] : no thyromegaly [Clear to Auscultation Bilat] : clear to auscultation bilaterally [Normal Sinus Rhythm] : normal sinus rhythm [Normal S1, S2] : normal S1 and S2 [Examined in the supine and seated position] : examined in the supine and seated position [Bra Size: ___] : Bra Size: [unfilled] [Grade 2] : Ptosis Grade 2 [No dominant masses] : no dominant masses in right breast  [No dominant masses] : no dominant masses left breast [No Nipple Retraction] : no left nipple retraction [No Nipple Discharge] : no left nipple discharge [No Axillary Lymphadenopathy] : no left axillary lymphadenopathy [Soft] : abdomen soft [Not Tender] : non-tender [No Palpable Masses] : no abdominal mass palpated [de-identified] : Core site lower outer.  Slight fullness in breast. [de-identified] : Small scar UOQ.

## 2022-10-26 NOTE — DATA REVIEWED
[FreeTextEntry1] : Bilateral mammogram 12/30/2021:  Focal heterogeneous calcifications posterior right upper outer quadrant and medial likely inferior breast, rec additional imaging.\par \par Bilateral breast ultrasound 12/30/2021:  Right 8N5 7 mm septated cyst. 9N8 90r2s76 mm irregular mass, rec targeted ultrasound\par \par Right mammogram 2/1/2022:  Scattered calcifications posterior UOQ associated with nodular mass suggestive of degenerating fibroadenoma.  Follow-up in 6 months.\par \par Right breast ultrasound 2/1/2022:  9 N8 mildly lobulated mass 92f2o55 mm with some cystic foci.\par \par Right mammogram 8/17/2022:  Previous coarse calcifications with vague nodular mass deep posterior UOQ.  Few additional calcifications some related to coarsening of pre-existing faint calcifications.  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  Subsegmental linear and clumped nonmass enhancement right upper outer quadrant and right lower outer quadrant spanning from posterior third to retroareolar region 16e15c29 cm.  MRI biopsy of anterior aspect can be performed if breast conservation a consideration.\par \par Pathology MRI biopsy 9/27/2022:  DCIS cribriform, micropapillary and papillary, intermediate grade, ER 90%

## 2022-10-26 NOTE — HISTORY OF PRESENT ILLNESS
[FreeTextEntry1] : This is a 62 year old  female who was found to have right breast calcifications on a routine mammogram in December 2021 for which a six month follow-up was advised.  The follow-up was done in August.  A biopsy was advised and showed DCIS.  Her Gynecologist [place] [facility] [place] showed a more extensive abnormality.  MRI biopsy of a second site also showed DCIS.  She was advised to have a mastectomy. She met with a Plastic surgeon and had been scheduled for surgery, but decided to come for another opinion.\par \par She had a previous benign biopsy of her left breast in 2002.
Parent(s)

## 2022-11-04 ENCOUNTER — APPOINTMENT (OUTPATIENT)
Dept: PLASTIC SURGERY | Facility: CLINIC | Age: 62
End: 2022-11-04

## 2022-11-04 VITALS
DIASTOLIC BLOOD PRESSURE: 83 MMHG | BODY MASS INDEX: 21.71 KG/M2 | TEMPERATURE: 98.1 F | HEART RATE: 80 BPM | HEIGHT: 61 IN | WEIGHT: 115 LBS | OXYGEN SATURATION: 99 % | SYSTOLIC BLOOD PRESSURE: 140 MMHG

## 2022-11-04 PROCEDURE — 99205 OFFICE O/P NEW HI 60 MIN: CPT

## 2022-11-04 NOTE — PHYSICAL EXAM
[Bra Size: _______] : Bra Size: [unfilled] [de-identified] : bilateral breast with grade 1 ptosis [de-identified] : With laxity but nt enough volume to match current breast volume

## 2022-11-04 NOTE — CONSULT LETTER
[Dear  ___] : Dear ~KENNEDY, [Consult Letter:] : I had the pleasure of evaluating your patient, [unfilled]. [Please see my note below.] : Please see my note below. [Consult Closing:] : Thank you very much for allowing me to participate in the care of this patient.  If you have any questions, please do not hesitate to contact me. [Sincerely,] : Sincerely, [FreeTextEntry2] : Hilaria Sánchez MD [FreeTextEntry3] : Basim Wing MD, FACS\par Professor and System Vice-Chair, Department of Surgery, [facility]\par Director of Oncologic Reconstruction, [facility]\par , [facility]\par \par [facility]\par [address] [place] [address]\par [place] [address]\par Phone: [phone]\par Fax: [phone]\par Email: [email or web address]\par

## 2022-11-04 NOTE — HISTORY OF PRESENT ILLNESS
[FreeTextEntry1] : Luh is a  62 year old female who presents for an initial consultation.  Right breast calcifications seen on routine mammogram in 2021.  Instructed to follow up in 6 months.  Patient underwent follow up mammogram in August and a biopsy was advised.  Biopsy on 8/3/22 showed DCIS (ER+, [place] -).  She was referred to [facility] by her GYN and an MRI guided biopsy was done on 22 showing extensive abnormality and a second site with DCIS.  She was then advised to have a mastectomy.  She met with a plastic surgeon and had been scheduled for surgery in October.  She was referred to Dr Murillo by a family friend.  Gene testing is pending. \par \par PMHx: Shogun's syndrome,\par SHx: left elbow fracture repair

## 2022-11-11 DIAGNOSIS — Z13.79 ENCOUNTER FOR OTHER SCREENING FOR GENETIC AND CHROMOSOMAL ANOMALIES: ICD-10-CM

## 2022-11-11 DIAGNOSIS — Z15.89 GENETIC SUSCEPTIBILITY TO OTHER DISEASE: ICD-10-CM

## 2022-12-14 ENCOUNTER — NON-APPOINTMENT (OUTPATIENT)
Age: 62
End: 2022-12-14

## 2022-12-16 ENCOUNTER — NON-APPOINTMENT (OUTPATIENT)
Age: 62
End: 2022-12-16

## 2022-12-28 ENCOUNTER — OUTPATIENT (OUTPATIENT)
Dept: OUTPATIENT SERVICES | Facility: HOSPITAL | Age: 62
LOS: 1 days | End: 2022-12-28
Payer: COMMERCIAL

## 2022-12-28 ENCOUNTER — RESULT REVIEW (OUTPATIENT)
Age: 62
End: 2022-12-28

## 2022-12-28 DIAGNOSIS — D05.11 INTRADUCTAL CARCINOMA IN SITU OF RIGHT BREAST: ICD-10-CM

## 2022-12-28 PROCEDURE — 88321 CONSLTJ&REPRT SLD PREP ELSWR: CPT

## 2022-12-29 DIAGNOSIS — D05.11 INTRADUCTAL CARCINOMA IN SITU OF RIGHT BREAST: ICD-10-CM

## 2022-12-29 LAB — SURGICAL PATHOLOGY STUDY: SIGNIFICANT CHANGE UP

## 2023-01-06 ENCOUNTER — APPOINTMENT (OUTPATIENT)
Dept: PLASTIC SURGERY | Facility: CLINIC | Age: 63
End: 2023-01-06
Payer: COMMERCIAL

## 2023-01-06 VITALS
WEIGHT: 112 LBS | TEMPERATURE: 98.1 F | SYSTOLIC BLOOD PRESSURE: 152 MMHG | OXYGEN SATURATION: 98 % | HEART RATE: 65 BPM | DIASTOLIC BLOOD PRESSURE: 87 MMHG | HEIGHT: 61 IN | BODY MASS INDEX: 21.14 KG/M2

## 2023-01-06 PROCEDURE — 99215 OFFICE O/P EST HI 40 MIN: CPT

## 2023-01-06 NOTE — CONSULT LETTER
[Dear  ___] : Dear ~KENNEDY, [Consult Letter:] : I had the pleasure of evaluating your patient, [unfilled]. [Please see my note below.] : Please see my note below. [Consult Closing:] : Thank you very much for allowing me to participate in the care of this patient.  If you have any questions, please do not hesitate to contact me. [Sincerely,] : Sincerely, [FreeTextEntry2] : Hilaria Sánchez MD [FreeTextEntry3] : Basim Wing MD, FACS\par Professor and System Vice-Chair, Department of Surgery, [facility]\par Director of Oncologic Reconstruction, [facility]\par , [facility]\par \par [facility]\par [address] [place] [address]\par [place] [address]\par Phone: [phone]\par Fax: [phone]\par Email: [email or web address]\par

## 2023-01-06 NOTE — HISTORY OF PRESENT ILLNESS
[FreeTextEntry1] : Luh is a  62 year old female who presents for a pre operative visit. Patient was diagnosed with right breast DCIS on 2022 and has decided to proceed with a mastectomy and TE/implant reconstruction. She would like to discuss the treatment plan.\par \par PMHx: Sjorgren's syndrome,\par SHx: left elbow fracture repair \par  [phone]

## 2023-01-06 NOTE — PHYSICAL EXAM
[de-identified] : alert, calm, cooperative\par  [de-identified] : respirations are even and unlabored\par  [de-identified] : prominent skin tag to anterior right chest wall below IMF [de-identified] : Bra Size: 36B, bilateral breast with grade 1 ptosis  [de-identified] : With laxity but nt enough volume to match current breast volume

## 2023-01-24 ENCOUNTER — OUTPATIENT (OUTPATIENT)
Dept: OUTPATIENT SERVICES | Facility: HOSPITAL | Age: 63
LOS: 1 days | End: 2023-01-24
Payer: COMMERCIAL

## 2023-01-24 ENCOUNTER — APPOINTMENT (OUTPATIENT)
Dept: MRI IMAGING | Facility: CLINIC | Age: 63
End: 2023-01-24
Payer: COMMERCIAL

## 2023-01-24 ENCOUNTER — RESULT REVIEW (OUTPATIENT)
Age: 63
End: 2023-01-24

## 2023-01-24 DIAGNOSIS — Z01.818 ENCOUNTER FOR OTHER PREPROCEDURAL EXAMINATION: ICD-10-CM

## 2023-01-24 DIAGNOSIS — D05.11 INTRADUCTAL CARCINOMA IN SITU OF RIGHT BREAST: ICD-10-CM

## 2023-01-24 DIAGNOSIS — Z98.890 OTHER SPECIFIED POSTPROCEDURAL STATES: Chronic | ICD-10-CM

## 2023-01-24 LAB
ALBUMIN SERPL ELPH-MCNC: 3.8 G/DL — SIGNIFICANT CHANGE UP (ref 3.3–5)
ALP SERPL-CCNC: 83 U/L — SIGNIFICANT CHANGE UP (ref 40–120)
ALT FLD-CCNC: 33 U/L — SIGNIFICANT CHANGE UP (ref 12–78)
ANION GAP SERPL CALC-SCNC: 4 MMOL/L — LOW (ref 5–17)
APTT BLD: 34.6 SEC — SIGNIFICANT CHANGE UP (ref 27.5–35.5)
AST SERPL-CCNC: 26 U/L — SIGNIFICANT CHANGE UP (ref 15–37)
BASOPHILS # BLD AUTO: 0.05 K/UL — SIGNIFICANT CHANGE UP (ref 0–0.2)
BASOPHILS NFR BLD AUTO: 1.2 % — SIGNIFICANT CHANGE UP (ref 0–2)
BILIRUB SERPL-MCNC: 0.4 MG/DL — SIGNIFICANT CHANGE UP (ref 0.2–1.2)
BLD GP AB SCN SERPL QL: SIGNIFICANT CHANGE UP
BUN SERPL-MCNC: 17 MG/DL — SIGNIFICANT CHANGE UP (ref 7–23)
CALCIUM SERPL-MCNC: 9.1 MG/DL — SIGNIFICANT CHANGE UP (ref 8.5–10.1)
CHLORIDE SERPL-SCNC: 106 MMOL/L — SIGNIFICANT CHANGE UP (ref 96–108)
CO2 SERPL-SCNC: 27 MMOL/L — SIGNIFICANT CHANGE UP (ref 22–31)
CREAT SERPL-MCNC: 0.75 MG/DL — SIGNIFICANT CHANGE UP (ref 0.5–1.3)
EGFR: 89 ML/MIN/1.73M2 — SIGNIFICANT CHANGE UP
EOSINOPHIL # BLD AUTO: 0.19 K/UL — SIGNIFICANT CHANGE UP (ref 0–0.5)
EOSINOPHIL NFR BLD AUTO: 4.5 % — SIGNIFICANT CHANGE UP (ref 0–6)
GLUCOSE SERPL-MCNC: 87 MG/DL — SIGNIFICANT CHANGE UP (ref 70–99)
HCT VFR BLD CALC: 40.1 % — SIGNIFICANT CHANGE UP (ref 34.5–45)
HGB BLD-MCNC: 13.3 G/DL — SIGNIFICANT CHANGE UP (ref 11.5–15.5)
IMM GRANULOCYTES NFR BLD AUTO: 0.2 % — SIGNIFICANT CHANGE UP (ref 0–0.9)
INR BLD: 1.04 RATIO — SIGNIFICANT CHANGE UP (ref 0.88–1.16)
LYMPHOCYTES # BLD AUTO: 1.13 K/UL — SIGNIFICANT CHANGE UP (ref 1–3.3)
LYMPHOCYTES # BLD AUTO: 26.5 % — SIGNIFICANT CHANGE UP (ref 13–44)
MCHC RBC-ENTMCNC: 28.9 PG — SIGNIFICANT CHANGE UP (ref 27–34)
MCHC RBC-ENTMCNC: 33.2 GM/DL — SIGNIFICANT CHANGE UP (ref 32–36)
MCV RBC AUTO: 87.2 FL — SIGNIFICANT CHANGE UP (ref 80–100)
MONOCYTES # BLD AUTO: 0.4 K/UL — SIGNIFICANT CHANGE UP (ref 0–0.9)
MONOCYTES NFR BLD AUTO: 9.4 % — SIGNIFICANT CHANGE UP (ref 2–14)
NEUTROPHILS # BLD AUTO: 2.48 K/UL — SIGNIFICANT CHANGE UP (ref 1.8–7.4)
NEUTROPHILS NFR BLD AUTO: 58.2 % — SIGNIFICANT CHANGE UP (ref 43–77)
PLATELET # BLD AUTO: 189 K/UL — SIGNIFICANT CHANGE UP (ref 150–400)
POTASSIUM SERPL-MCNC: 3.7 MMOL/L — SIGNIFICANT CHANGE UP (ref 3.5–5.3)
POTASSIUM SERPL-SCNC: 3.7 MMOL/L — SIGNIFICANT CHANGE UP (ref 3.5–5.3)
PROT SERPL-MCNC: 7.3 GM/DL — SIGNIFICANT CHANGE UP (ref 6–8.3)
PROTHROM AB SERPL-ACNC: 12.1 SEC — SIGNIFICANT CHANGE UP (ref 10.5–13.4)
RBC # BLD: 4.6 M/UL — SIGNIFICANT CHANGE UP (ref 3.8–5.2)
RBC # FLD: 12.9 % — SIGNIFICANT CHANGE UP (ref 10.3–14.5)
SODIUM SERPL-SCNC: 137 MMOL/L — SIGNIFICANT CHANGE UP (ref 135–145)
WBC # BLD: 4.26 K/UL — SIGNIFICANT CHANGE UP (ref 3.8–10.5)
WBC # FLD AUTO: 4.26 K/UL — SIGNIFICANT CHANGE UP (ref 3.8–10.5)

## 2023-01-24 PROCEDURE — 80053 COMPREHEN METABOLIC PANEL: CPT

## 2023-01-24 PROCEDURE — 74185 MRA ABD W OR W/O CNTRST: CPT | Mod: 26

## 2023-01-24 PROCEDURE — 87640 STAPH A DNA AMP PROBE: CPT

## 2023-01-24 PROCEDURE — A9585: CPT

## 2023-01-24 PROCEDURE — 36415 COLL VENOUS BLD VENIPUNCTURE: CPT

## 2023-01-24 PROCEDURE — 87641 MR-STAPH DNA AMP PROBE: CPT

## 2023-01-24 PROCEDURE — 71046 X-RAY EXAM CHEST 2 VIEWS: CPT | Mod: 26

## 2023-01-24 PROCEDURE — 72198 MR ANGIO PELVIS W/O & W/DYE: CPT | Mod: 26

## 2023-01-24 PROCEDURE — 93010 ELECTROCARDIOGRAM REPORT: CPT

## 2023-01-24 PROCEDURE — 86850 RBC ANTIBODY SCREEN: CPT

## 2023-01-24 PROCEDURE — 85025 COMPLETE CBC W/AUTO DIFF WBC: CPT

## 2023-01-24 PROCEDURE — 86900 BLOOD TYPING SEROLOGIC ABO: CPT

## 2023-01-24 PROCEDURE — C8902: CPT

## 2023-01-24 PROCEDURE — C8920: CPT

## 2023-01-24 PROCEDURE — 85730 THROMBOPLASTIN TIME PARTIAL: CPT

## 2023-01-24 PROCEDURE — 93005 ELECTROCARDIOGRAM TRACING: CPT

## 2023-01-24 PROCEDURE — 71046 X-RAY EXAM CHEST 2 VIEWS: CPT

## 2023-01-24 PROCEDURE — 85610 PROTHROMBIN TIME: CPT

## 2023-01-24 PROCEDURE — 86901 BLOOD TYPING SEROLOGIC RH(D): CPT

## 2023-01-24 PROCEDURE — 86923 COMPATIBILITY TEST ELECTRIC: CPT

## 2023-01-24 NOTE — PATIENT PROFILE ADULT - STATED REASON FOR ADMISSION
right breast mastectomy with Dr. Vega Sánchez and right breast reconstruction HANS with implants with Dr. Wing

## 2023-01-24 NOTE — CHART NOTE - NSCHARTNOTEFT_GEN_A_CORE
63 y/o female presents to PST for scheduled right breast mastectomy with Dr. eVga Sánchez and right breast reconstruction HANS with implants with Dr. Wing on 1/30/23.    vs- 70 hr 128/82 b/p  rr 12 o2 sat 97 % on ra weight 110lbs height 5'0    cbc, mp, type and screen, ptt/inr, a1c, mrsa, albumin, u/a, urine culture, carboxyhemoglobin done today in PST       right breast cancer   Pre op and chlorhexidine instructions given and explained.  Avoid NSAIDs and OTC supplements.   Patient verbalized understanding  medical consult requested by surgeon  covid 19 swab scheduled, advised to quarantine after test       sjogren  continue medication as prescribed 61 y/o female presents to PST for scheduled right breast mastectomy with Dr. Vega Sánchez and right breast reconstruction HANS with implants with Dr. Wing on 23.    vs- 70 hr 128/82 b/p  rr 12 o2 sat 97 % on ra weight 110lbs height 5'0    cbc, mp, type and screen, ptt/inr, a1c, mrsa, albumin, u/a, urine culture, carboxyhemoglobin done today in [facility]       CAPRINI SCORE    AGE RELATED RISK FACTORS                                                       MOBILITY RELATED FACTORS  [ ] Age 41-60 years                                            (1 Point)                  [ ] Bed rest                                                        (1 Point)  [x ] Age: 61-74 years                                           (2 Points)                [ ] Plaster cast                                                   (2 Points)  [ ] Age= 75 years                                              (3 Points)                 [ ] Bed bound for more than 72 hours                   (2 Points)    DISEASE RELATED RISK FACTORS                                               GENDER SPECIFIC FACTORS  [ ] Edema in the lower extremities                       (1 Point)                  [ ] Pregnancy                                                     (1 Point)  [ ] Varicose veins                                               (1 Point)                  [ ] Post-partum < 6 weeks                                   (1 Point)             [ ] BMI > 25 Kg/m2                                            (1 Point)                  [ ] Hormonal therapy  or oral contraception            (1 Point)                 [ ] Sepsis (in the previous month)                        (1 Point)                  [ ] History of pregnancy complications  [ ] Pneumonia or serious lung disease                                               [ ] Unexplained or recurrent                       (1 Point)           (in the previous month)                               (1 Point)  [ ] Abnormal pulmonary function test                     (1 Point)                 SURGERY RELATED RISK FACTORS  [ ] Acute myocardial infarction                              (1 Point)                 [ ]  Section                                            (1 Point)  [ ] Congestive heart failure (in the previous month)  (1 Point)                 [ ] Minor surgery                                                 (1 Point)   [ ] Inflammatory bowel disease                             (1 Point)                 [ ] Arthroscopic surgery                                        (2 Points)  [ ] Central venous access                                    (2 Points)                [ x] General surgery lasting more than 45 minutes   (2 Points)       [ ] Stroke (in the previous month)                          (5 Points)               [ ] Elective arthroplasty                                        (5 Points)            [ ] malignancy                                                             (2 points)                                                                                                                                 HEMATOLOGY RELATED FACTORS                                                 TRAUMA RELATED RISK FACTORS  [ ] Prior episodes of VTE                                     (3 Points)                 [ ] Fracture of the hip, pelvis, or leg                       (5 Points)  [ ] Positive family history for VTE                         (3 Points)                 [ ] Acute spinal cord injury (in the previous month)  (5 Points)  [ ] Prothrombin 86667 A                                      (3 Points)                 [ ] Paralysis  (less than 1 month)                          (5 Points)  [ ] Factor V Leiden                                             (3 Points)                 [ ] Multiple Trauma within 1 month                         (5 Points)  [ ] Lupus anticoagulants                                     (3 Points)                                                           [ ] Anticardiolipin antibodies                                (3 Points)                                                       [ ] High homocysteine in the blood                      (3 Points)                                             [ ] Other congenital or acquired thrombophilia       (3 Points)                                                [ ] Heparin induced thrombocytopenia                  (3 Points)                                          Total Score [     2     ]    The Caprini score indicates that this patient is low risk for a VTE event (score 0-2).  VTE prophylaxis should focus on early ambulation.  Intermittent compression devices (IPC) may be of benefit to some patients       right breast cancer   Pre op and chlorhexidine instructions given and explained.  Avoid NSAIDs and OTC supplements.   Patient verbalized understanding  medical consult requested by surgeon  covid 19 swab scheduled, advised to quarantine after test       sjogren  continue medication as prescribed

## 2023-01-25 DIAGNOSIS — Z01.818 ENCOUNTER FOR OTHER PREPROCEDURAL EXAMINATION: ICD-10-CM

## 2023-01-25 LAB
MRSA PCR RESULT.: SIGNIFICANT CHANGE UP
S AUREUS DNA NOSE QL NAA+PROBE: SIGNIFICANT CHANGE UP

## 2023-01-25 RX ORDER — KETOROLAC TROMETHAMINE 10 MG/1
10 TABLET, FILM COATED ORAL EVERY 8 HOURS
Qty: 12 | Refills: 0 | Status: DISCONTINUED | COMMUNITY
Start: 2023-01-25 | End: 2023-01-25

## 2023-01-26 ENCOUNTER — NON-APPOINTMENT (OUTPATIENT)
Age: 63
End: 2023-01-26

## 2023-01-26 ENCOUNTER — APPOINTMENT (OUTPATIENT)
Dept: FAMILY MEDICINE | Facility: CLINIC | Age: 63
End: 2023-01-26
Payer: COMMERCIAL

## 2023-01-26 VITALS
SYSTOLIC BLOOD PRESSURE: 134 MMHG | WEIGHT: 110 LBS | BODY MASS INDEX: 20.77 KG/M2 | TEMPERATURE: 98.3 F | DIASTOLIC BLOOD PRESSURE: 72 MMHG | HEIGHT: 61 IN | OXYGEN SATURATION: 97 % | HEART RATE: 86 BPM

## 2023-01-26 PROCEDURE — 99214 OFFICE O/P EST MOD 30 MIN: CPT

## 2023-01-26 NOTE — HISTORY OF PRESENT ILLNESS
[No Pertinent Cardiac History] : no history of aortic stenosis, atrial fibrillation, coronary artery disease, recent myocardial infarction, or implantable device/pacemaker [No Pertinent Pulmonary History] : no history of asthma, COPD, sleep apnea, or smoking [No Adverse Anesthesia Reaction] : no adverse anesthesia reaction in self or family member [(Patient denies any chest pain, claudication, dyspnea on exertion, orthopnea, palpitations or syncope)] : Patient denies any chest pain, claudication, dyspnea on exertion, orthopnea, palpitations or syncope [Chronic Anticoagulation] : no chronic anticoagulation [Chronic Kidney Disease] : no chronic kidney disease [Diabetes] : no diabetes [FreeTextEntry1] : Unilateral Mastectomy and Reconstruction [FreeTextEntry2] : 1/30/2023 [FreeTextEntry3] : Dr Vega Sánchez [FreeTextEntry4] : KYAW MELO is a 62 year old female presenting to establish PCP as well as seek MCA for right sided mastectomy on Monday 1/30/2023.

## 2023-01-26 NOTE — CONSULT LETTER
[Dear  ___] : Dear ~KENNEDY, [( Thank you for referring [unfilled] for consultation for _____ )] : Thank you for referring [unfilled] for consultation for [unfilled] [Please see my note below.] : Please see my note below. [Consult Closing:] : Thank you very much for allowing me to participate in the care of this patient.  If you have any questions, please do not hesitate to contact me. [Sincerely,] : Sincerely, [FreeTextEntry3] : \par \par Nabila Anthony DO\par Diplomate of [employer]\par  at [employer] at [facility]\par

## 2023-01-30 ENCOUNTER — RESULT REVIEW (OUTPATIENT)
Age: 63
End: 2023-01-30

## 2023-01-30 ENCOUNTER — APPOINTMENT (OUTPATIENT)
Dept: PLASTIC SURGERY | Facility: HOSPITAL | Age: 63
End: 2023-01-30

## 2023-01-30 ENCOUNTER — APPOINTMENT (OUTPATIENT)
Dept: PLASTIC SURGERY | Facility: HOSPITAL | Age: 63
End: 2023-01-30
Payer: COMMERCIAL

## 2023-01-30 ENCOUNTER — TRANSCRIPTION ENCOUNTER (OUTPATIENT)
Age: 63
End: 2023-01-30

## 2023-01-30 ENCOUNTER — APPOINTMENT (OUTPATIENT)
Dept: BREAST CENTER | Facility: HOSPITAL | Age: 63
End: 2023-01-30

## 2023-01-30 ENCOUNTER — INPATIENT (INPATIENT)
Facility: HOSPITAL | Age: 63
LOS: 1 days | Discharge: HOME HEALTH | DRG: 580 | End: 2023-02-01
Attending: SURGERY | Admitting: SURGERY
Payer: COMMERCIAL

## 2023-01-30 VITALS
TEMPERATURE: 98 F | WEIGHT: 110.01 LBS | HEART RATE: 91 BPM | SYSTOLIC BLOOD PRESSURE: 134 MMHG | DIASTOLIC BLOOD PRESSURE: 87 MMHG | HEIGHT: 61 IN | RESPIRATION RATE: 16 BRPM | OXYGEN SATURATION: 100 %

## 2023-01-30 DIAGNOSIS — D05.11 INTRADUCTAL CARCINOMA IN SITU OF RIGHT BREAST: ICD-10-CM
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
## 2023-01-30 DIAGNOSIS — Z98.890 OTHER SPECIFIED POSTPROCEDURAL STATES: Chronic | ICD-10-CM

## 2023-01-30 PROCEDURE — 38792 RA TRACER ID OF SENTINL NODE: CPT | Mod: RT,59

## 2023-01-30 PROCEDURE — 88304 TISSUE EXAM BY PATHOLOGIST: CPT

## 2023-01-30 PROCEDURE — 38900 IO MAP OF SENT LYMPH NODE: CPT

## 2023-01-30 PROCEDURE — 88305 TISSUE EXAM BY PATHOLOGIST: CPT

## 2023-01-30 PROCEDURE — 88342 IMHCHEM/IMCYTCHM 1ST ANTB: CPT

## 2023-01-30 PROCEDURE — P9016: CPT

## 2023-01-30 PROCEDURE — 38530 BIOPSY/REMOVAL LYMPH NODES: CPT | Mod: RT

## 2023-01-30 PROCEDURE — 85027 COMPLETE CBC AUTOMATED: CPT

## 2023-01-30 PROCEDURE — 64912 NRV RPR W/NRV ALGRFT 1ST: CPT | Mod: RT

## 2023-01-30 PROCEDURE — S2067: CPT | Mod: RT

## 2023-01-30 PROCEDURE — C9290: CPT

## 2023-01-30 PROCEDURE — 11200 RMVL SKIN TAGS UP TO&INC 15: CPT

## 2023-01-30 PROCEDURE — 88307 TISSUE EXAM BY PATHOLOGIST: CPT

## 2023-01-30 PROCEDURE — 88341 IMHCHEM/IMCYTCHM EA ADD ANTB: CPT

## 2023-01-30 PROCEDURE — 80048 BASIC METABOLIC PNL TOTAL CA: CPT

## 2023-01-30 PROCEDURE — 88307 TISSUE EXAM BY PATHOLOGIST: CPT | Mod: 26

## 2023-01-30 PROCEDURE — 19303 MAST SIMPLE COMPLETE: CPT | Mod: AS,RT

## 2023-01-30 PROCEDURE — 88305 TISSUE EXAM BY PATHOLOGIST: CPT | Mod: 26

## 2023-01-30 PROCEDURE — 88304 TISSUE EXAM BY PATHOLOGIST: CPT | Mod: 26

## 2023-01-30 PROCEDURE — C1889: CPT

## 2023-01-30 PROCEDURE — 88342 IMHCHEM/IMCYTCHM 1ST ANTB: CPT | Mod: 26

## 2023-01-30 PROCEDURE — 36415 COLL VENOUS BLD VENIPUNCTURE: CPT

## 2023-01-30 PROCEDURE — C1762: CPT

## 2023-01-30 PROCEDURE — 38525 BIOPSY/REMOVAL LYMPH NODES: CPT | Mod: RT

## 2023-01-30 PROCEDURE — 88341 IMHCHEM/IMCYTCHM EA ADD ANTB: CPT | Mod: 26

## 2023-01-30 PROCEDURE — 21600 PARTIAL REMOVAL OF RIB: CPT | Mod: RT

## 2023-01-30 PROCEDURE — 36430 TRANSFUSION BLD/BLD COMPNT: CPT

## 2023-01-30 PROCEDURE — 19303 MAST SIMPLE COMPLETE: CPT

## 2023-01-30 PROCEDURE — 21501 I&D DP ABSC/HMTMA SFT TS NCK: CPT | Mod: 78

## 2023-01-30 PROCEDURE — A9520: CPT

## 2023-01-30 RX ORDER — KETOROLAC TROMETHAMINE 30 MG/ML
15 SYRINGE (ML) INJECTION EVERY 6 HOURS
Refills: 0 | Status: DISCONTINUED | OUTPATIENT
Start: 2023-01-30 | End: 2023-02-01

## 2023-01-30 RX ORDER — CHOLECALCIFEROL (VITAMIN D3) 125 MCG
1 CAPSULE ORAL
Qty: 0 | Refills: 0 | DISCHARGE

## 2023-01-30 RX ORDER — SODIUM CHLORIDE 9 MG/ML
500 INJECTION, SOLUTION INTRAVENOUS ONCE
Refills: 0 | Status: COMPLETED | OUTPATIENT
Start: 2023-01-30 | End: 2023-01-30

## 2023-01-30 RX ORDER — INFLUENZA VIRUS VACCINE 15; 15; 15; 15 UG/.5ML; UG/.5ML; UG/.5ML; UG/.5ML
0.5 SUSPENSION INTRAMUSCULAR ONCE
Refills: 0 | Status: DISCONTINUED | OUTPATIENT
Start: 2023-01-30 | End: 2023-02-01

## 2023-01-30 RX ORDER — CEFAZOLIN SODIUM 1 G
2000 VIAL (EA) INJECTION EVERY 8 HOURS
Refills: 0 | Status: COMPLETED | OUTPATIENT
Start: 2023-01-30 | End: 2023-02-01

## 2023-01-30 RX ORDER — SODIUM CHLORIDE 9 MG/ML
1000 INJECTION, SOLUTION INTRAVENOUS
Refills: 0 | Status: DISCONTINUED | OUTPATIENT
Start: 2023-01-30 | End: 2023-02-01

## 2023-01-30 RX ORDER — OXYCODONE HYDROCHLORIDE 5 MG/1
5 TABLET ORAL EVERY 4 HOURS
Refills: 0 | Status: DISCONTINUED | OUTPATIENT
Start: 2023-01-30 | End: 2023-02-01

## 2023-01-30 RX ORDER — FAMOTIDINE 10 MG/ML
20 INJECTION INTRAVENOUS
Refills: 0 | Status: DISCONTINUED | OUTPATIENT
Start: 2023-01-30 | End: 2023-02-01

## 2023-01-30 RX ORDER — ONDANSETRON 8 MG/1
4 TABLET, FILM COATED ORAL EVERY 6 HOURS
Refills: 0 | Status: DISCONTINUED | OUTPATIENT
Start: 2023-01-30 | End: 2023-02-01

## 2023-01-30 RX ORDER — ACETAMINOPHEN 500 MG
1000 TABLET ORAL EVERY 6 HOURS
Refills: 0 | Status: COMPLETED | OUTPATIENT
Start: 2023-01-30 | End: 2023-01-31

## 2023-01-30 RX ORDER — ENOXAPARIN SODIUM 100 MG/ML
40 INJECTION SUBCUTANEOUS EVERY 24 HOURS
Refills: 0 | Status: DISCONTINUED | OUTPATIENT
Start: 2023-01-31 | End: 2023-02-01

## 2023-01-30 RX ORDER — SENNA PLUS 8.6 MG/1
2 TABLET ORAL AT BEDTIME
Refills: 0 | Status: DISCONTINUED | OUTPATIENT
Start: 2023-01-30 | End: 2023-02-01

## 2023-01-30 RX ORDER — CEVIMELINE 30 MG/1
1 CAPSULE ORAL
Qty: 0 | Refills: 0 | DISCHARGE

## 2023-01-30 RX ORDER — FENTANYL CITRATE 50 UG/ML
25 INJECTION INTRAVENOUS
Refills: 0 | Status: DISCONTINUED | OUTPATIENT
Start: 2023-01-30 | End: 2023-01-31

## 2023-01-30 RX ORDER — HYDROMORPHONE HYDROCHLORIDE 2 MG/ML
0.5 INJECTION INTRAMUSCULAR; INTRAVENOUS; SUBCUTANEOUS
Refills: 0 | Status: DISCONTINUED | OUTPATIENT
Start: 2023-01-30 | End: 2023-01-31

## 2023-01-30 RX ORDER — OXYCODONE HYDROCHLORIDE 5 MG/1
5 TABLET ORAL ONCE
Refills: 0 | Status: DISCONTINUED | OUTPATIENT
Start: 2023-01-30 | End: 2023-01-31

## 2023-01-30 RX ORDER — ACETAMINOPHEN 500 MG
650 TABLET ORAL EVERY 6 HOURS
Refills: 0 | Status: COMPLETED | OUTPATIENT
Start: 2023-01-30 | End: 2023-12-29

## 2023-01-30 RX ORDER — SODIUM CHLORIDE 9 MG/ML
1000 INJECTION, SOLUTION INTRAVENOUS
Refills: 0 | Status: DISCONTINUED | OUTPATIENT
Start: 2023-01-30 | End: 2023-01-30

## 2023-01-30 RX ORDER — SODIUM CHLORIDE 9 MG/ML
1000 INJECTION, SOLUTION INTRAVENOUS
Refills: 0 | Status: DISCONTINUED | OUTPATIENT
Start: 2023-01-30 | End: 2023-01-31

## 2023-01-30 RX ADMIN — HYDROMORPHONE HYDROCHLORIDE 0.5 MILLIGRAM(S): 2 INJECTION INTRAMUSCULAR; INTRAVENOUS; SUBCUTANEOUS at 20:53

## 2023-01-30 RX ADMIN — HYDROMORPHONE HYDROCHLORIDE 0.5 MILLIGRAM(S): 2 INJECTION INTRAMUSCULAR; INTRAVENOUS; SUBCUTANEOUS at 20:38

## 2023-01-30 RX ADMIN — FAMOTIDINE 20 MILLIGRAM(S): 10 INJECTION INTRAVENOUS at 22:23

## 2023-01-30 RX ADMIN — SODIUM CHLORIDE 1000 MILLILITER(S): 9 INJECTION, SOLUTION INTRAVENOUS at 23:00

## 2023-01-30 RX ADMIN — HYDROMORPHONE HYDROCHLORIDE 0.5 MILLIGRAM(S): 2 INJECTION INTRAMUSCULAR; INTRAVENOUS; SUBCUTANEOUS at 20:35

## 2023-01-30 RX ADMIN — HYDROMORPHONE HYDROCHLORIDE 0.5 MILLIGRAM(S): 2 INJECTION INTRAMUSCULAR; INTRAVENOUS; SUBCUTANEOUS at 20:20

## 2023-01-30 NOTE — CHART NOTE - NSCHARTNOTEFT_GEN_A_CORE
STATUS POST: Immediate right breast reconstruction with deep inferior epigastric  free flap, placement of acellular dermal matrix, , partial rib resection, biopsy of internal mammary lymph node    POST OPERATIVE DAY #: 0    patient seen and examined at bedside. pain controlled. denies dizzienss, SOB, CP or palpitations     Vital Signs Last 24 Hrs  T(C): 37.7 (30 Jan 2023 19:16), Max: 37.7 (30 Jan 2023 19:16)  T(F): 99.8 (30 Jan 2023 19:16), Max: 99.8 (30 Jan 2023 19:16)  HR: 70 (30 Jan 2023 22:30) (65 - 91)  BP: 95/50 (30 Jan 2023 22:30) (92/46 - 134/87)  BP(mean): --  RR: 12 (30 Jan 2023 22:30) (12 - 22)  SpO2: 99% (30 Jan 2023 22:30) (96% - 100%)    Parameters below as of 30 Jan 2023 19:16  Patient On (Oxygen Delivery Method): nasal cannula  O2 Flow (L/min): 3    I&O's Summary    30 Jan 2023 07:01  -  30 Jan 2023 23:18  --------------------------------------------------------  IN: 2100 mL / OUT: 969 mL / NET: 1131 mL      I&O's Detail    30 Jan 2023 07:01  -  30 Jan 2023 23:18  --------------------------------------------------------  IN:    Lactated Ringers: 300 mL    Other (mL): 1800 mL  Total IN: 2100 mL    OUT:    Bulb (mL): 20 mL    Bulb (mL): 39 mL    Bulb (mL): 90 mL    Indwelling Catheter - Urethral (mL): 720 mL    Other (mL): 100 mL  Total OUT: 969 mL    Total NET: 1131 mL      MEDICATIONS  (STANDING):  acetaminophen     Tablet .. 650 milliGRAM(s) Oral every 6 hours  acetaminophen   IVPB .. 1000 milliGRAM(s) IV Intermittent every 6 hours  ceFAZolin   IVPB 2000 milliGRAM(s) IV Intermittent every 8 hours  famotidine Injectable 20 milliGRAM(s) IV Push two times a day  influenza   Vaccine 0.5 milliLiter(s) IntraMuscular once  ketorolac   Injectable 15 milliGRAM(s) IV Push every 6 hours  lactated ringers Bolus 500 milliLiter(s) IV Bolus once  lactated ringers. 1000 milliLiter(s) (75 mL/Hr) IV Continuous <Continuous>  lactated ringers. 1000 milliLiter(s) (100 mL/Hr) IV Continuous <Continuous>  senna 2 Tablet(s) Oral at bedtime    MEDICATIONS  (PRN):  fentaNYL    Injectable 25 MICROGram(s) IV Push every 5 minutes PRN Moderate Pain (4 - 6)  HYDROmorphone  Injectable 0.5 milliGRAM(s) IV Push every 10 minutes PRN Moderate Pain (4 - 6)  ondansetron Injectable 4 milliGRAM(s) IV Push every 6 hours PRN Nausea and/or Vomiting  oxyCODONE    IR 5 milliGRAM(s) Oral every 4 hours PRN Moderate Pain (4 - 6)  oxyCODONE    IR 5 milliGRAM(s) Oral once PRN Moderate Pain (4 - 6)      LABS:                RADIOLOGY & ADDITIONAL STUDIES:    PHYSICAL EXAM:    Constitutional: NAD, alert  Breasts: right breast with mild edema, no induration or active bleeding, Vioptix saturation 93%, signal 90. (baseline 89% / 90). incision c/d/i. drain with ~ 30 cc serosanguinous fluid  Respiratory: nonlabor breathing   Cardiovascular: RRR  Gastrointestinal: soft, ND/NT, incision c/d/i. drains x 2 , right with ~15 cc serosanguinous fluid, left with ~ 5 cc sarosnaguinous fluid          A/P: 62y Female with breast ca at right, now POD# 0 s/p Immediate right breast reconstruction with deep inferior epigastric  free flap, placement of acellular dermal matrix, , partial rib resection, biopsy of internal mammary lymph node (1/30)    - NPO/IVF overnight   - Continue vioptix monitoring  - pain control  - f/u AM labs  - zofran prn   - DVT ppx: Lovenox     Surgery   Sumi Tsang PA-C

## 2023-01-30 NOTE — BRIEF OPERATIVE NOTE - OPERATION/FINDINGS
Immediate right breast reconstruction with deep inferior epigastric  free flap, placement of acellular dermal matrix, placement of silicone implant, partial rib resection, biopsy of internal mammary lymph node. Immediate right breast reconstruction with deep inferior epigastric  free flap, placement of acellular dermal matrix, , partial rib resection, biopsy of internal mammary lymph node.

## 2023-01-30 NOTE — ASU PREOP CHECKLIST - BOWEL PREP
“You can access the FollowHealth Patient Portal, offered by [facility], by registering with the following website: [email or web address]” n/a

## 2023-01-30 NOTE — PATIENT PROFILE ADULT - NSPROPOAPRESSUREINJURY_GEN_A_NUR
- recommended to be on full dose lovenox with bridge to coumadin (lovenox 1mg/kg BID)   - monitor INR
no

## 2023-01-30 NOTE — BRIEF OPERATIVE NOTE - NSICDXBRIEFPREOP_GEN_ALL_CORE_FT
PRE-OP DIAGNOSIS:  Cancer of breast, intraductal, right 30-Jan-2023 12:31:47  Sarah Brito  
PRE-OP DIAGNOSIS:  Cancer of breast, intraductal, right 30-Jan-2023 12:31:47  Sarah Brito

## 2023-01-31 LAB
ANION GAP SERPL CALC-SCNC: 4 MMOL/L — LOW (ref 5–17)
BUN SERPL-MCNC: 14 MG/DL — SIGNIFICANT CHANGE UP (ref 7–23)
CALCIUM SERPL-MCNC: 8.1 MG/DL — LOW (ref 8.5–10.1)
CHLORIDE SERPL-SCNC: 108 MMOL/L — SIGNIFICANT CHANGE UP (ref 96–108)
CO2 SERPL-SCNC: 27 MMOL/L — SIGNIFICANT CHANGE UP (ref 22–31)
CREAT SERPL-MCNC: 0.77 MG/DL — SIGNIFICANT CHANGE UP (ref 0.5–1.3)
EGFR: 86 ML/MIN/1.73M2 — SIGNIFICANT CHANGE UP
GLUCOSE SERPL-MCNC: 114 MG/DL — HIGH (ref 70–99)
HCT VFR BLD CALC: 25.4 % — LOW (ref 34.5–45)
HCT VFR BLD CALC: 29.6 % — LOW (ref 34.5–45)
HCT VFR BLD CALC: 32.1 % — LOW (ref 34.5–45)
HCT VFR BLD CALC: 32.7 % — LOW (ref 34.5–45)
HGB BLD-MCNC: 10.5 G/DL — LOW (ref 11.5–15.5)
HGB BLD-MCNC: 10.8 G/DL — LOW (ref 11.5–15.5)
HGB BLD-MCNC: 8.4 G/DL — LOW (ref 11.5–15.5)
HGB BLD-MCNC: 9.8 G/DL — LOW (ref 11.5–15.5)
MCHC RBC-ENTMCNC: 28 PG — SIGNIFICANT CHANGE UP (ref 27–34)
MCHC RBC-ENTMCNC: 29.3 PG — SIGNIFICANT CHANGE UP (ref 27–34)
MCHC RBC-ENTMCNC: 29.4 PG — SIGNIFICANT CHANGE UP (ref 27–34)
MCHC RBC-ENTMCNC: 29.5 PG — SIGNIFICANT CHANGE UP (ref 27–34)
MCHC RBC-ENTMCNC: 32.7 GM/DL — SIGNIFICANT CHANGE UP (ref 32–36)
MCHC RBC-ENTMCNC: 33 GM/DL — SIGNIFICANT CHANGE UP (ref 32–36)
MCHC RBC-ENTMCNC: 33.1 GM/DL — SIGNIFICANT CHANGE UP (ref 32–36)
MCHC RBC-ENTMCNC: 33.1 GM/DL — SIGNIFICANT CHANGE UP (ref 32–36)
MCV RBC AUTO: 85.6 FL — SIGNIFICANT CHANGE UP (ref 80–100)
MCV RBC AUTO: 88.6 FL — SIGNIFICANT CHANGE UP (ref 80–100)
MCV RBC AUTO: 88.9 FL — SIGNIFICANT CHANGE UP (ref 80–100)
MCV RBC AUTO: 89.1 FL — SIGNIFICANT CHANGE UP (ref 80–100)
PLATELET # BLD AUTO: 149 K/UL — LOW (ref 150–400)
PLATELET # BLD AUTO: 159 K/UL — SIGNIFICANT CHANGE UP (ref 150–400)
PLATELET # BLD AUTO: 164 K/UL — SIGNIFICANT CHANGE UP (ref 150–400)
PLATELET # BLD AUTO: 177 K/UL — SIGNIFICANT CHANGE UP (ref 150–400)
POTASSIUM SERPL-MCNC: 4.5 MMOL/L — SIGNIFICANT CHANGE UP (ref 3.5–5.3)
POTASSIUM SERPL-SCNC: 4.5 MMOL/L — SIGNIFICANT CHANGE UP (ref 3.5–5.3)
RBC # BLD: 2.85 M/UL — LOW (ref 3.8–5.2)
RBC # BLD: 3.33 M/UL — LOW (ref 3.8–5.2)
RBC # BLD: 3.69 M/UL — LOW (ref 3.8–5.2)
RBC # BLD: 3.75 M/UL — LOW (ref 3.8–5.2)
RBC # FLD: 13.2 % — SIGNIFICANT CHANGE UP (ref 10.3–14.5)
RBC # FLD: 14.6 % — HIGH (ref 10.3–14.5)
SODIUM SERPL-SCNC: 139 MMOL/L — SIGNIFICANT CHANGE UP (ref 135–145)
WBC # BLD: 11.22 K/UL — HIGH (ref 3.8–10.5)
WBC # BLD: 7.33 K/UL — SIGNIFICANT CHANGE UP (ref 3.8–10.5)
WBC # BLD: 7.6 K/UL — SIGNIFICANT CHANGE UP (ref 3.8–10.5)
WBC # BLD: 8.43 K/UL — SIGNIFICANT CHANGE UP (ref 3.8–10.5)
WBC # FLD AUTO: 11.22 K/UL — HIGH (ref 3.8–10.5)
WBC # FLD AUTO: 7.33 K/UL — SIGNIFICANT CHANGE UP (ref 3.8–10.5)
WBC # FLD AUTO: 7.6 K/UL — SIGNIFICANT CHANGE UP (ref 3.8–10.5)
WBC # FLD AUTO: 8.43 K/UL — SIGNIFICANT CHANGE UP (ref 3.8–10.5)

## 2023-01-31 PROCEDURE — 99232 SBSQ HOSP IP/OBS MODERATE 35: CPT

## 2023-01-31 RX ORDER — SODIUM CHLORIDE 9 MG/ML
1000 INJECTION, SOLUTION INTRAVENOUS
Refills: 0 | Status: DISCONTINUED | OUTPATIENT
Start: 2023-01-31 | End: 2023-01-31

## 2023-01-31 RX ORDER — HYDROMORPHONE HYDROCHLORIDE 2 MG/ML
0.5 INJECTION INTRAMUSCULAR; INTRAVENOUS; SUBCUTANEOUS
Refills: 0 | Status: DISCONTINUED | OUTPATIENT
Start: 2023-01-31 | End: 2023-01-31

## 2023-01-31 RX ORDER — ACETAMINOPHEN 500 MG
650 TABLET ORAL EVERY 6 HOURS
Refills: 0 | Status: DISCONTINUED | OUTPATIENT
Start: 2023-01-31 | End: 2023-02-01

## 2023-01-31 RX ORDER — FENTANYL CITRATE 50 UG/ML
25 INJECTION INTRAVENOUS
Refills: 0 | Status: DISCONTINUED | OUTPATIENT
Start: 2023-01-31 | End: 2023-01-31

## 2023-01-31 RX ORDER — SODIUM CHLORIDE 9 MG/ML
1000 INJECTION, SOLUTION INTRAVENOUS ONCE
Refills: 0 | Status: COMPLETED | OUTPATIENT
Start: 2023-01-31 | End: 2023-01-31

## 2023-01-31 RX ADMIN — FAMOTIDINE 20 MILLIGRAM(S): 10 INJECTION INTRAVENOUS at 10:28

## 2023-01-31 RX ADMIN — Medication 100 MILLIGRAM(S): at 02:55

## 2023-01-31 RX ADMIN — SODIUM CHLORIDE 1000 MILLILITER(S): 9 INJECTION, SOLUTION INTRAVENOUS at 10:15

## 2023-01-31 RX ADMIN — Medication 15 MILLIGRAM(S): at 09:44

## 2023-01-31 RX ADMIN — Medication 400 MILLIGRAM(S): at 20:11

## 2023-01-31 RX ADMIN — Medication 15 MILLIGRAM(S): at 21:45

## 2023-01-31 RX ADMIN — FAMOTIDINE 20 MILLIGRAM(S): 10 INJECTION INTRAVENOUS at 21:11

## 2023-01-31 RX ADMIN — ONDANSETRON 4 MILLIGRAM(S): 8 TABLET, FILM COATED ORAL at 00:21

## 2023-01-31 RX ADMIN — Medication 1000 MILLIGRAM(S): at 20:45

## 2023-01-31 RX ADMIN — Medication 100 MILLIGRAM(S): at 18:42

## 2023-01-31 RX ADMIN — Medication 100 MILLIGRAM(S): at 09:44

## 2023-01-31 RX ADMIN — Medication 400 MILLIGRAM(S): at 14:22

## 2023-01-31 RX ADMIN — Medication 15 MILLIGRAM(S): at 21:11

## 2023-01-31 RX ADMIN — SODIUM CHLORIDE 100 MILLILITER(S): 9 INJECTION, SOLUTION INTRAVENOUS at 06:24

## 2023-01-31 RX ADMIN — Medication 400 MILLIGRAM(S): at 00:59

## 2023-01-31 RX ADMIN — Medication 15 MILLIGRAM(S): at 14:35

## 2023-01-31 RX ADMIN — Medication 15 MILLIGRAM(S): at 16:20

## 2023-01-31 RX ADMIN — Medication 1000 MILLIGRAM(S): at 06:53

## 2023-01-31 RX ADMIN — Medication 400 MILLIGRAM(S): at 06:38

## 2023-01-31 RX ADMIN — Medication 1000 MILLIGRAM(S): at 01:14

## 2023-01-31 RX ADMIN — Medication 15 MILLIGRAM(S): at 16:00

## 2023-01-31 RX ADMIN — Medication 1000 MILLIGRAM(S): at 15:20

## 2023-01-31 NOTE — BRIEF OPERATIVE NOTE - NSICDXBRIEFPROCEDURE_GEN_ALL_CORE_FT
PROCEDURES:  Nipple sparing mastectomy of right breast 30-Jan-2023 15:19:24  Yonis Guzman  Simple mastectomy with sentinel node biopsy 30-Jan-2023 15:19:44  Yonis Guzman  
PROCEDURES:  Evacuation of hematoma of breast 31-Jan-2023 04:43:18  Sumi Tsang  
PROCEDURES:  HANS flap, free 30-Jan-2023 12:30:12  Sarah Brito  Application, acellular dermal matrix 30-Jan-2023 12:30:31  Sarah Brito  Partial rib resection 30-Jan-2023 12:30:38  Sarah Brito  Biopsy, lymph node, mammary 30-Jan-2023 12:30:58  Sarah Brito  Mastectomy, with immediate implant insertion 30-Jan-2023 12:31:37  Sarah Brito

## 2023-01-31 NOTE — CHART NOTE - NSCHARTNOTEFT_GEN_A_CORE
Nurse called for expanding hematoma at right breast extending to right axillary     Patient seen and examined at bedside. no complaints. pain controlled  Right breast harder than previous exam, hematoma appreciated extending to right axillary. incision c/d/i. Vioptix maintains @ 93% saturation, 89 signal  Called Dr. Wing, send CBC  Patient will be taken back to OR for hematoma evacuation    d/w Dr. Maciej Tsang PA-C

## 2023-02-01 ENCOUNTER — TRANSCRIPTION ENCOUNTER (OUTPATIENT)
Age: 63
End: 2023-02-01

## 2023-02-01 VITALS
SYSTOLIC BLOOD PRESSURE: 116 MMHG | OXYGEN SATURATION: 100 % | HEART RATE: 76 BPM | TEMPERATURE: 98 F | DIASTOLIC BLOOD PRESSURE: 67 MMHG | RESPIRATION RATE: 17 BRPM

## 2023-02-01 LAB
ANION GAP SERPL CALC-SCNC: 1 MMOL/L — LOW (ref 5–17)
BUN SERPL-MCNC: 16 MG/DL — SIGNIFICANT CHANGE UP (ref 7–23)
CALCIUM SERPL-MCNC: 8.3 MG/DL — LOW (ref 8.5–10.1)
CHLORIDE SERPL-SCNC: 112 MMOL/L — HIGH (ref 96–108)
CO2 SERPL-SCNC: 29 MMOL/L — SIGNIFICANT CHANGE UP (ref 22–31)
CREAT SERPL-MCNC: 0.81 MG/DL — SIGNIFICANT CHANGE UP (ref 0.5–1.3)
EGFR: 81 ML/MIN/1.73M2 — SIGNIFICANT CHANGE UP
GLUCOSE SERPL-MCNC: 101 MG/DL — HIGH (ref 70–99)
HCT VFR BLD CALC: 29.4 % — LOW (ref 34.5–45)
HGB BLD-MCNC: 9.7 G/DL — LOW (ref 11.5–15.5)
MCHC RBC-ENTMCNC: 28.7 PG — SIGNIFICANT CHANGE UP (ref 27–34)
MCHC RBC-ENTMCNC: 33 GM/DL — SIGNIFICANT CHANGE UP (ref 32–36)
MCV RBC AUTO: 87 FL — SIGNIFICANT CHANGE UP (ref 80–100)
PLATELET # BLD AUTO: 125 K/UL — LOW (ref 150–400)
POTASSIUM SERPL-MCNC: 4 MMOL/L — SIGNIFICANT CHANGE UP (ref 3.5–5.3)
POTASSIUM SERPL-SCNC: 4 MMOL/L — SIGNIFICANT CHANGE UP (ref 3.5–5.3)
RBC # BLD: 3.38 M/UL — LOW (ref 3.8–5.2)
RBC # FLD: 15.1 % — HIGH (ref 10.3–14.5)
SODIUM SERPL-SCNC: 142 MMOL/L — SIGNIFICANT CHANGE UP (ref 135–145)
WBC # BLD: 5.07 K/UL — SIGNIFICANT CHANGE UP (ref 3.8–10.5)
WBC # FLD AUTO: 5.07 K/UL — SIGNIFICANT CHANGE UP (ref 3.8–10.5)

## 2023-02-01 PROCEDURE — 99238 HOSP IP/OBS DSCHRG MGMT 30/<: CPT

## 2023-02-01 RX ORDER — OXYCODONE HYDROCHLORIDE 5 MG/1
1 TABLET ORAL
Qty: 0 | Refills: 0 | DISCHARGE
Start: 2023-02-01

## 2023-02-01 RX ADMIN — OXYCODONE HYDROCHLORIDE 5 MILLIGRAM(S): 5 TABLET ORAL at 07:27

## 2023-02-01 RX ADMIN — Medication 100 MILLIGRAM(S): at 03:31

## 2023-02-01 RX ADMIN — Medication 15 MILLIGRAM(S): at 03:31

## 2023-02-01 RX ADMIN — Medication 650 MILLIGRAM(S): at 04:38

## 2023-02-01 RX ADMIN — Medication 650 MILLIGRAM(S): at 06:13

## 2023-02-01 RX ADMIN — Medication 650 MILLIGRAM(S): at 12:45

## 2023-02-01 RX ADMIN — FAMOTIDINE 20 MILLIGRAM(S): 10 INJECTION INTRAVENOUS at 11:45

## 2023-02-01 RX ADMIN — Medication 100 MILLIGRAM(S): at 11:45

## 2023-02-01 RX ADMIN — SENNA PLUS 2 TABLET(S): 8.6 TABLET ORAL at 04:38

## 2023-02-01 RX ADMIN — Medication 15 MILLIGRAM(S): at 03:52

## 2023-02-01 RX ADMIN — Medication 15 MILLIGRAM(S): at 09:35

## 2023-02-01 RX ADMIN — Medication 15 MILLIGRAM(S): at 09:04

## 2023-02-01 RX ADMIN — SODIUM CHLORIDE 75 MILLILITER(S): 9 INJECTION, SOLUTION INTRAVENOUS at 00:34

## 2023-02-01 RX ADMIN — Medication 650 MILLIGRAM(S): at 11:45

## 2023-02-01 NOTE — DISCHARGE NOTE NURSING/CASE MANAGEMENT/SOCIAL WORK - NSDCCRNAME_GEN_ALL_CORE_FT
Discharge folder provided including private hire list, home care agency list, community and cancer resources

## 2023-02-01 NOTE — DISCHARGE NOTE PROVIDER - NSDCFUADDINST_GEN_ALL_CORE_FT
d/c instructions - ROM, activity restrictions, sleeping, bathing, drains, no hot or cold packs, meds, and f/u instruction given by Dr. Wing

## 2023-02-01 NOTE — DISCHARGE NOTE PROVIDER - NSDCFUSCHEDAPPT_GEN_ALL_CORE_FT
[facility]  PLASTICSUR [address] [facility]  Scheduled Appointment: 02/03/2023    Hilaria Murillo  [facility]  BREAST [address]  Scheduled Appointment: 02/07/2023

## 2023-02-01 NOTE — PROGRESS NOTE ADULT - PROVIDER SPECIALTY LIST ADULT
Plastic Surgery
Surgery
Surgery

## 2023-02-01 NOTE — DISCHARGE NOTE NURSING/CASE MANAGEMENT/SOCIAL WORK - NSDCPEFALRISK_GEN_ALL_CORE
For information on Fall & Injury Prevention, visit: [email or web address] OR  [email or web address] OR  https://www.cdc.gov/steadi/patient.html

## 2023-02-01 NOTE — DISCHARGE NOTE PROVIDER - HOSPITAL COURSE
S/P Right Mastectomy and S/P Immediate right breast reconstruction with deep inferior epigastric  free flap, placement of acellular dermal matrix, , partial rib resection, biopsy of internal mammary lymph node. S/P Hematoma Evacuation, S/P 1U PRBC    Patient doing well. Ambulating, voiding, eating, pain controlled.   vss/a jps mod serosang output  flap viable  mastectomy skin viable   abdomen cdi    Doing well.   -reviewed again d/c instructions - ROM, activity restrictions, sleeping, bathing, drains, no hot or cold packs, meds, and f/u instruction given by Dr. Wing

## 2023-02-01 NOTE — DISCHARGE NOTE PROVIDER - NSDCMRMEDTOKEN_GEN_ALL_CORE_FT
cevimeline 30 mg oral capsule: 1 cap(s) orally 3 times a day  ***pt can bring own***  oxyCODONE 5 mg oral tablet: 1 tab(s) orally every 4 hours, As needed, Moderate Pain (4 - 6)  Vitamin D3 25 mcg (1000 intl units) oral tablet: 1 tab(s) orally once a day

## 2023-02-01 NOTE — DISCHARGE NOTE PROVIDER - CARE PROVIDER_API CALL
Basim Wing)  Plastic Surgery  [address] [place] [address], [address]  [place] [address]  Phone: [phone]  Fax: [phone]  Follow Up Time: 1-3 days

## 2023-02-01 NOTE — DISCHARGE NOTE NURSING/CASE MANAGEMENT/SOCIAL WORK - PATIENT PORTAL LINK FT
You can access the FollowMyHealth Patient Portal offered by [facility] by registering at the following website: [email or web address]. By joining [employer]’s FollowMyHealth portal, you will also be able to view your health information using other applications (apps) compatible with our system.

## 2023-02-01 NOTE — PROGRESS NOTE ADULT - REASON FOR ADMISSION
POD 1 s/p right stacked HANS after right skin sparing mastectomy, and evacuation of post-operative breast hematoma.
Right mastectomy/HANS
right mastectomy and stacked HANS flap
right mastectomy and stacked virgil flap
right mastectomy and stacked virgil flaps
Right breast DCIS
right mastectomy and stacked virgil flap and lymph node biopsies (right axillary and internal mammary).

## 2023-02-01 NOTE — PROGRESS NOTE ADULT - SUBJECTIVE AND OBJECTIVE BOX
S:   c/o surgical site pain in breast area 8/10.    O:  Returned to OR at 4 am for left breast hematoma.       Alert       Afebrile, /50       right breast soft.  Mild ecchymosis laterally.  Drains sang.       Abdominal closure intact.       H/H 9/8/29.6       
Called at 1:43am by PA on call because the right breast was more swollen and concern for hematoma. Patient seen at 2:10am and evaluate and noted to have minor fullness laterally. JAYDEN with about 130cc of bloody output from the right breast since surgery but with some clot in the bulb.   Flap with strong vioptix signal in the 90's and good color. Not tense. Given the clot in the drain and the increased fullness, it was discussed with the patient that returning to the operating room would allow us to wash out the clot and see if there was a site of active bleeding. Informed consent was taken for exploration of right breast and evacuation of hematoma and all related or necessary procedures. It was discussed with the patient that she might receive some blood depending on what her next Hct comes back. She is currently hemodynamically stable but felt tired. SBP in the 120s and HR normal when examined but was in the 90s earlier. Patient on call for OR.
Patient doing well. Ambulating, voiding, eating, pain controlled.   vss/a jps mod serosang output  flap viable  mastectomy skin viable   abdomen cdi    Doing well.   -reviewed again d/c instructions - ROM, activity restrictions, sleeping, bathing, drains, no hot or cold packs, meds, and f/u  -all questions answered. Patient has appt to see us Friday. She has prescriptions. She also has cell phone number to call if any questions.  -reviewed plans with nursing  -PA team to d/c home and do summary later today.
62y Female with POD 1 s/p right stacked HANS after right skin sparing mastectomy, and evacuation of post-operative breast hematoma evaluated today in PACU for hypotension, pt endorsed some lightheadedness and stated the situation of her low blood pressure was making her anxious but denies any palpitation, SOB, N/V/abdominal pain    acetaminophen     Tablet .. 650 milliGRAM(s) Oral every 6 hours  acetaminophen   IVPB .. 1000 milliGRAM(s) IV Intermittent every 6 hours  ceFAZolin   IVPB 2000 milliGRAM(s) IV Intermittent every 8 hours  enoxaparin Injectable 40 milliGRAM(s) SubCutaneous every 24 hours  famotidine Injectable 20 milliGRAM(s) IV Push two times a day  influenza   Vaccine 0.5 milliLiter(s) IntraMuscular once  ketorolac   Injectable 15 milliGRAM(s) IV Push every 6 hours  lactated ringers. 1000 milliLiter(s) IV Continuous <Continuous>  ondansetron Injectable 4 milliGRAM(s) IV Push every 6 hours PRN  oxyCODONE    IR 5 milliGRAM(s) Oral every 4 hours PRN  senna 2 Tablet(s) Oral at bedtime      Vital Signs Last 24 Hrs  T(C): 37.1 (31 Jan 2023 14:56), Max: 37.7 (30 Jan 2023 19:16)  T(F): 98.8 (31 Jan 2023 14:56), Max: 99.8 (30 Jan 2023 19:16)  HR: 87 (31 Jan 2023 14:56) (65 - 98)  BP: 115/57 (31 Jan 2023 14:56) (74/45 - 133/71)  BP(mean): --  RR: 18 (31 Jan 2023 14:56) (12 - 22)  SpO2: 100% (31 Jan 2023 14:56) (96% - 100%)    Parameters below as of 31 Jan 2023 14:56  Patient On (Oxygen Delivery Method): room air        I&O's Detail    30 Jan 2023 07:01  -  31 Jan 2023 07:00  --------------------------------------------------------  IN:    Lactated Ringers: 100 mL    Lactated Ringers: 800 mL    Lactated Ringers Bolus: 500 mL    Other (mL): 800 mL    Other (mL): 1800 mL  Total IN: 4000 mL    OUT:    Bulb (mL): 247 mL    Bulb (mL): 36 mL    Bulb (mL): 90 mL    Indwelling Catheter - Urethral (mL): 1440 mL    Other (mL): 150 mL    Other (mL): 100 mL  Total OUT: 2063 mL    Total NET: 1937 mL      31 Jan 2023 07:01  -  31 Jan 2023 16:48  --------------------------------------------------------  IN:    Lactated Ringers: 500 mL    Lactated Ringers: 400 mL    Lactated Ringers Bolus: 1000 mL    Oral Fluid: 500 mL    PRBCs (Packed Red Blood Cells): 336 mL  Total IN: 2736 mL    OUT:    Bulb (mL): 12 mL    Bulb (mL): 37 mL    Bulb (mL): 55 mL    Indwelling Catheter - Urethral (mL): 2550 mL    Voided (mL): 200 mL  Total OUT: 2854 mL    Total NET: -118 mL          Physical Exam  Gen: NAD, comfortable in bed  Neuro: A&Ox3  Breasts: Flaps intact, good color, capillary refill bl. No swelling or hematomas appreciated. JAYDEN x 3   Lungs: CTAB  CV: S1/S2, RRR  Abd: Soft, ND, lower abdominal incision c/d/i.  +abdominal JPs in place   Extremities: Venodynes in place. No LE edema bl                          10.5   7.33  )-----------( 164      ( 31 Jan 2023 13:37 )             32.1       01-31    139  |  108  |  14  ----------------------------<  114<H>  4.5   |  27  |  0.77    Ca    8.1<L>      31 Jan 2023 07:06      
Patient doing well. OOB to chair. In good spirits and pain controlled. Tolerating POs well.  vss/a JAYDEN mod serosang  mastectomy skin viable with some lateral ecchymosis related to prior hematoma  abdomen cdi  flap viable with vioptix in mid 80s    Hct 32    Doing well  -patient to ambulate  -incentive spirometry  -SCDs   -plan for d/c home tomorrow if stable.  
Patient seen on rounds this morning at 7am.  REsting comfortably in PACU.  Complaining of moderate right breast pain.    AF VSS  Vioptix 86/90  Right reconstructed breast soft, no sign of seroma/hematoma.  Small amount of right lateral bruising.    Mastectomy skin flap with moderate bruising, no sign of ischemia  HANS skin paddle normal appearance.    POD 1 s/p right stacked HANS after right skin sparing mastectomy, and evacuation of post-operative breast hematoma, stable condition.    -Awaiting transfer to [address] [place] [address].  -Continue care per post-op HANS protocol.
Patient taken to OR for right breast hematoma. At surgery, some clot was found primarily laterally and inferiorly. After evacuation of hematoma, a small arterial bleeder was found in the lateral chest wall and this was clipped. After further irrigation and removal of clot, a small additional area of bleeding on the lateral flap was also identified and clipped. No other sources of bleeding were identified. The patients pressure was brought up to about 115-120 systolic during the case to make sure there was not other obvious bleeding sources. A new 19 linda drain was placed. EBL was approximately 150cc. The flap was without evidence of compromise and the vioptix readings postoperatively remainded in the 90s. Patient was stable through out. No blood was given.
S:  C/o incisional pain at abdominal incision    O:  received 1 unit prbc yesterday for low H/H       Alert       Afebrile, VSS       Right breast soft.  Flap sat 80%       Abdomen flat, closure intact       Drains serosang       Hct 32

## 2023-02-01 NOTE — PROGRESS NOTE ADULT - ASSESSMENT
A/P: 62y  Female POD 1  s/p HANS- hypotensive, responded to fluid bolus and 1unit PRBC's moved to 1N from PACU  Breast flaps appear viable  Stable Vioptics  ADAT  Strict I&O's  Pain control  DVT prophylaxis/OOB  Encourage Incentive spirometry
S/ right mastectomy/HANS  Stable  Increase activity  Discharge later todat
S/p right mastectomy/HANS  Stable this morning  Transfer to floor when bed available  Advance diet  OOB to chair

## 2023-02-01 NOTE — DISCHARGE NOTE PROVIDER - NSDCCPTREATMENT_GEN_ALL_CORE_FT
PRINCIPAL PROCEDURE  Procedure: Simple mastectomy with excision of axillary lymph nodes  Findings and Treatment:       SECONDARY PROCEDURE  Procedure: Reconstruction of breast with deep inferior epigastric artery  (HANS) free flap  Findings and Treatment:     Procedure: Evacuation of hematoma of breast  Findings and Treatment:

## 2023-02-02 NOTE — CDI QUERY NOTE - NSCDIOTHERTXTBX2_GEN_ALL_CORE_FT
Clinical documentation indicates that this patient has decreased Hemoglobin and Hematocrit blood values without an associated diagnosis.   In order to accurately capture this lab finding to the greatest degree of specificity reflecting the patient’s actual severity of illness please document the diagnosis, if known, associated with the below  values & clinical evidence:      -Hypotension with hypovolemic shock   -Unable to rule out  Hypotension with hypovolemic shock   -Hypotension only   -Other please specify  -Clinically not significative    Supporting Documentation and/or Clinical Evidence: 4 L fluid resurcitation and PRBC transfusion    Treatment transfusion, repeated hematology , monitoring labs    Mon Jan 30 2023 23:20:00 [id number] Patient taken to OR for right breast hematoma. At surgery, some clot was foundprimarily laterally and inferiorly. After evacuation of hematoma, a small  arterial bleeder was found in the lateral chest wall and this was clipped.After further irrigation and removal of clot, a small additional area ofbleeding on the lateral flap was also identified and clipped. No other sources of bleeding were identified. The patients pressure was brought up to [id number] systolic during the case to make sure there was not other obviousbleeding sources. A new 19 linda drain was placed. EBL was approximately 150cc.The flap was without evidence of compromise and the vioptix readingspostoperatively remainded in the 90s. Patient was stable through out. No bloodwas given.    Tue Jan 31 2023 06:15:00 [id number] Right reconstructed breast soft, no sign of seroma/hematoma. Small amount ofright lateral bruising.Mastectomy skin flap with moderate bruising, no sign of ischemiaTue Jan 31 2023 11:46:00 [id number] PRBCs (Packed Red Blood Cells): 336 mL    Lactated Ringers: 100 mL Lactated Ringers: 800 mL Lactated Ringers Bolus: 500 mL Other (mL): 800 mL Other (mL): 1800 mL Total IN: 4000 mL  Lactated Ringers: 500 mLLactated Ringers: 400 mL  Lactated Ringers Bolus: 1000 mL  PRBCs (Packed Red Blood Cells): 336 mL    [id number] BP: 115/57 (31 Jan 2023 14:56) (74/45 - 133/71)Mon Jan 30 2023 15:45:00 [id number] BP: 95/50 (30 Jan 2023 22:30) (92/46     Plastic surgery 1/31-Tue Jan 31 2023 11:46:00 [id number] 62y Female with POD 1 s/p right stacked HANS after right skin sparingmastectomy, and evacuation of post-operative breast hematoma evaluated today in  PACU for hypotension, pt endorsed some lightheadedness and stated the situationof her low blood pressure was making her anxious but denies any palpitation,SOB, N/V/abdominal pain    DC summary-S/P Right Mastectomy and S/P Immediate right breast reconstruction with deep inferior epigastric  free flap, placement of acellular dermal matrix, , partial rib resection, biopsy of internal mammary lymph node. S/P Hematoma Evacuation, S/P 1U PRBC Clinical documentation indicates that this patient has decreased Blood Pressure  without an associated diagnosis.   In order to accurately capture this lab finding to the greatest degree of specificity reflecting the patient’s actual severity of illness please document the diagnosis, if known, associated with the below  values & clinical evidence:      -Hypotension with hypovolemic shock   -Unable to rule out  Hypotension with hypovolemic shock   -Hypotension only   -Other please specify  -Clinically not significative    Supporting Documentation and/or Clinical Evidence: 4 L fluid resuscitation and PRBC transfusion    Treatment transfusion, repeated hematology , monitoring labs    Mon Jan 30 2023 23:20:00 [id number] Patient taken to OR for right breast hematoma. At surgery, some clot was found primarily laterally and inferiorly. After evacuation of hematoma, a small  arterial bleeder was found in the lateral chest wall and this was clipped.After further irrigation and removal of clot, a small additional area ofbleeding on the lateral flap was also identified and clipped. No other sources of bleeding were identified. The patients pressure was brought up to [id number] systolic during the case to make sure there was not other obviousbleeding sources. A new 19 linda drain was placed. EBL was approximately 150cc.The flap was without evidence of compromise and the vioptix readingspostoperatively remainded in the 90s. Patient was stable through out. No bloodwas given.    Tue Jan 31 2023 06:15:00 [email or web address] Right reconstructed breast soft, no sign of seroma/hematoma. Small amount ofright lateral bruising.Mastectomy skin flap with moderate bruising, no sign of ischemiaTue Jan 31 2023 11:46:00 [id number] PRBCs (Packed Red Blood Cells): 336 mL    Lactated Ringers: 100 mL Lactated Ringers: 800 mL Lactated Ringers Bolus: 500 mL Other (mL): 800 mL Other (mL): 1800 mL Total IN: 4000 mL  Lactated Ringers: 500 mLLactated Ringers: 400 mL  Lactated Ringers Bolus: 1000 mL  PRBCs (Packed Red Blood Cells): 336 mL    [id number] BP: 115/57 (31 Jan 2023 14:56) (74/45 - 133/71)Mon Jan 30 2023 15:45:00 [email or web address] BP: 95/50 (30 Jan 2023 22:30) (92/46     Plastic surgery 1/31-Tue Jan 31 2023 11:46:00 [id number] 62y Female with POD 1 s/p right stacked HANS after right skin sparingmastectomy, and evacuation of post-operative breast hematoma evaluated today in  PACU for hypotension, pt endorsed some lightheadedness and stated the situationof her low blood pressure was making her anxious but denies any palpitation,SOB, N/V/abdominal pain    DC summary-S/P Right Mastectomy and S/P Immediate right breast reconstruction with deep inferior epigastric  free flap, placement of acellular dermal matrix, , partial rib resection, biopsy of internal mammary lymph node. S/P Hematoma Evacuation, S/P 1U PRBC

## 2023-02-02 NOTE — CDI QUERY NOTE - NSCDIOTHERTXTBX_GEN_ALL_CORE_HH
Clinical documentation indicates that this patient has decreased Hemoglobin and Hematocrit blood values without an associated diagnosis.   In order to accurately capture this lab finding to the greatest degree of specificity reflecting the patient’s actual severity of illness please document the diagnosis, if known, associated with the below  values & clinical evidence:      -Blood loss anemia post procedural   -Unable to rule out Blood loss anemia post procedural   -Acute Blood loss anemia  -Other please specify  -Clinically not significative    Supporting Documentation and/or Clinical Evidence:     Treatment transfusion, repeated hematology , monitoring labs    Hemoglobin: 9.7 g/dL (02.01.23 @ 06:54)   Hemoglobin: 10.5 g/dL (01.31.23 @ 13:37)   Hemoglobin: 8.4 g/dL (01.31.23 @ 11:19)   Hemoglobin: 9.8 g/dL (01.31.23 @ 07:06)   Hemoglobin: 10.8 g/dL (01.31.23 @ 01:50)   Hemoglobin: 13.3 g/dL (01.24.23 @ 15:01)       Hematocrit: 29.4 % (02.01.23 @ 06:54)   Hematocrit: 32.1 % (01.31.23 @ 13:37)   Hematocrit: 25.4 % (01.31.23 @ 11:19)   Hematocrit: 29.6 % (01.31.23 @ 07:06)   Hematocrit: 32.7 % (01.31.23 @ 01:50)   Hematocrit: 40.1 % (01.24.23 @ 15:01)     Mon Jan 30 2023 23:20:00 [id number] Patient taken to OR for right breast hematoma. At surgery, some clot was foundprimarily laterally and inferiorly. After evacuation of hematoma, a small  arterial bleeder was found in the lateral chest wall and this was clipped.After further irrigation and removal of clot, a small additional area ofbleeding on the lateral flap was also identified and clipped. No other sources of bleeding were identified. The patients pressure was brought up to [id number] systolic during the case to make sure there was not other obviousbleeding sources. A new 19 linda drain was placed. EBL was approximately 150cc.The flap was without evidence of compromise and the vioptix readingspostoperatively remainded in the 90s. Patient was stable through out. No bloodwas given.    Tue Jan 31 2023 06:15:00 [id number] Right reconstructed breast soft, no sign of seroma/hematoma. Small amount ofright lateral bruising.Mastectomy skin flap with moderate bruising, no sign of ischemia    Tue Jan 31 2023 11:46:00 [id number] PRBCs (Packed Red Blood Cells): 336 mL    DC summary-S/P Right Mastectomy and S/P Immediate right breast reconstruction with deep inferior epigastric  free flap, placement of acellular dermal matrix, , partial rib resection, biopsy of internal mammary lymph node. S/P Hematoma Evacuation, S/P 1U PRBC

## 2023-02-03 ENCOUNTER — APPOINTMENT (OUTPATIENT)
Dept: PLASTIC SURGERY | Facility: CLINIC | Age: 63
End: 2023-02-03
Payer: COMMERCIAL

## 2023-02-03 VITALS
TEMPERATURE: 98.7 F | DIASTOLIC BLOOD PRESSURE: 98 MMHG | HEIGHT: 60 IN | OXYGEN SATURATION: 98 % | HEART RATE: 87 BPM | BODY MASS INDEX: 21.6 KG/M2 | SYSTOLIC BLOOD PRESSURE: 159 MMHG | WEIGHT: 110 LBS

## 2023-02-03 PROCEDURE — 99024 POSTOP FOLLOW-UP VISIT: CPT

## 2023-02-06 DIAGNOSIS — D05.11 INTRADUCTAL CARCINOMA IN SITU OF RIGHT BREAST: ICD-10-CM

## 2023-02-06 DIAGNOSIS — M35.00 SJOGREN SYNDROME, UNSPECIFIED: ICD-10-CM

## 2023-02-06 DIAGNOSIS — D62 ACUTE POSTHEMORRHAGIC ANEMIA: ICD-10-CM
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
## 2023-02-06 DIAGNOSIS — Z91.013 ALLERGY TO SEAFOOD: ICD-10-CM

## 2023-02-06 DIAGNOSIS — L76.32 POSTPROCEDURAL HEMATOMA OF SKIN AND SUBCUTANEOUS TISSUE FOLLOWING OTHER PROCEDURE: ICD-10-CM

## 2023-02-06 LAB — SURGICAL PATHOLOGY STUDY: SIGNIFICANT CHANGE UP

## 2023-02-07 ENCOUNTER — APPOINTMENT (OUTPATIENT)
Dept: BREAST CENTER | Facility: CLINIC | Age: 63
End: 2023-02-07
Payer: COMMERCIAL

## 2023-02-07 VITALS
BODY MASS INDEX: 21.6 KG/M2 | HEART RATE: 86 BPM | HEIGHT: 60 IN | SYSTOLIC BLOOD PRESSURE: 125 MMHG | WEIGHT: 110 LBS | DIASTOLIC BLOOD PRESSURE: 74 MMHG

## 2023-02-07 PROBLEM — Z87.39 PERSONAL HISTORY OF OTHER DISEASES OF THE MUSCULOSKELETAL SYSTEM AND CONNECTIVE TISSUE: Chronic | Status: ACTIVE | Noted: 2023-01-30

## 2023-02-07 PROBLEM — K11.8 OTHER DISEASES OF SALIVARY GLANDS: Chronic | Status: ACTIVE | Noted: 2023-01-30

## 2023-02-07 PROCEDURE — 99024 POSTOP FOLLOW-UP VISIT: CPT

## 2023-02-07 RX ORDER — OXYCODONE 5 MG/1
5 TABLET ORAL
Qty: 18 | Refills: 0 | Status: DISCONTINUED | COMMUNITY
Start: 2023-01-25 | End: 2023-02-07

## 2023-02-07 RX ORDER — CEFADROXIL 500 MG/1
500 CAPSULE ORAL TWICE DAILY
Qty: 4 | Refills: 1 | Status: DISCONTINUED | COMMUNITY
Start: 2023-01-25 | End: 2023-02-07

## 2023-02-07 NOTE — DATA REVIEWED
[FreeTextEntry1] : Bilateral mammogram 12/30/2021:  Focal heterogeneous calcifications posterior right upper outer quadrant and medial likely inferior breast, rec additional imaging.\par \par Bilateral breast ultrasound 12/30/2021:  Right 8N5 7 mm septated cyst. 9N8 71g9h08 mm irregular mass, rec targeted ultrasound\par \par Right mammogram 2/1/2022:  Scattered calcifications posterior UOQ associated with nodular mass suggestive of degenerating fibroadenoma.  Follow-up in 6 months.\par \par Right breast ultrasound 2/1/2022:  9 N8 mildly lobulated mass 32o2e00 mm with some cystic foci.\par \par Right mammogram 8/17/2022:  Previous coarse calcifications with vague nodular mass deep posterior UOQ.  Few additional calcifications some related to coarsening of pre-existing faint calcifications.  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  Subsegmental linear and clumped nonmass enhancement right upper outer quadrant and right lower outer quadrant spanning from posterior third to retroareolar region 27r24u36 cm.  MRI biopsy of anterior aspect can be performed if breast conservation a consideration.\par \par Pathology MRI biopsy 9/27/2022:  DCIS cribriform, micropapillary and papillary, intermediate grade, ER 90%\par \par Surgical pathology 1/30/2023:  Right mastectomy- 2 sites of DCIS, micropapillary and cribriform with intermediate to high nuclear grade and necrosis, margins negative, SLN x2 negative

## 2023-02-07 NOTE — PHYSICAL EXAM
[de-identified] : Circular skin island with vertical incision 6:00 healing well. Drain serosang. [de-identified] : Lower incision healing well. Periumbilcal area with dry eschar. Drains serosang.

## 2023-02-07 NOTE — HISTORY OF PRESENT ILLNESS
[FreeTextEntry1] : This is a 62 year old  female who was found to have right breast calcifications on a routine mammogram in December 2021 for which a six month follow-up was advised.  The follow-up was done in August.  A biopsy was advised and showed DCIS.  Her Gynecologist referred her [facility] [place], [place] where an MRI showed a more extensive abnormality.  MRI biopsy of a second site also showed DCIS. \par \par She had a previous benign biopsy of her left breast in 2002.\par \par She opted to have a right mastectomy with HANS reconstruction on 1/30/2023 in conjunction with Dr. Wing.  She is doing well and has no complaints.  She is only taking Tylenol for pain. She is here with her cousin.\par \par The right breast drain is still draining over 40 cc/day, but the abdominal drains are less.  She spoke to DevonMiNamemejia 2 days ago.\par \par Pathology shows 2 sites on DCIS.  Margins are negative and SLN x2 are negative.

## 2023-02-10 ENCOUNTER — APPOINTMENT (OUTPATIENT)
Dept: PLASTIC SURGERY | Facility: CLINIC | Age: 63
End: 2023-02-10
Payer: COMMERCIAL

## 2023-02-10 VITALS
OXYGEN SATURATION: 97 % | RESPIRATION RATE: 16 BRPM | HEIGHT: 60 IN | SYSTOLIC BLOOD PRESSURE: 123 MMHG | HEART RATE: 88 BPM | WEIGHT: 110 LBS | BODY MASS INDEX: 21.6 KG/M2 | DIASTOLIC BLOOD PRESSURE: 68 MMHG | TEMPERATURE: 97.3 F

## 2023-02-10 PROBLEM — C50.919 MALIGNANT NEOPLASM OF UNSPECIFIED SITE OF UNSPECIFIED FEMALE BREAST: Chronic | Status: ACTIVE | Noted: 2023-01-24

## 2023-02-10 PROCEDURE — 99024 POSTOP FOLLOW-UP VISIT: CPT

## 2023-02-16 ENCOUNTER — APPOINTMENT (OUTPATIENT)
Dept: PLASTIC SURGERY | Facility: CLINIC | Age: 63
End: 2023-02-16
Payer: COMMERCIAL

## 2023-02-16 VITALS
TEMPERATURE: 98.2 F | OXYGEN SATURATION: 100 % | HEART RATE: 82 BPM | WEIGHT: 110 LBS | BODY MASS INDEX: 21.6 KG/M2 | HEIGHT: 60 IN | DIASTOLIC BLOOD PRESSURE: 84 MMHG | SYSTOLIC BLOOD PRESSURE: 125 MMHG

## 2023-02-16 PROCEDURE — 99024 POSTOP FOLLOW-UP VISIT: CPT

## 2023-02-16 RX ORDER — ONDANSETRON 4 MG/1
4 TABLET, ORALLY DISINTEGRATING ORAL
Qty: 9 | Refills: 0 | Status: DISCONTINUED | COMMUNITY
Start: 2023-01-25 | End: 2023-02-16

## 2023-02-16 RX ORDER — KETOROLAC TROMETHAMINE 10 MG/1
10 TABLET, FILM COATED ORAL EVERY 6 HOURS
Qty: 14 | Refills: 0 | Status: DISCONTINUED | COMMUNITY
Start: 2023-01-25 | End: 2023-02-16

## 2023-02-16 NOTE — PHYSICAL EXAM
[de-identified] : alert, calm, cooperative\par  [de-identified] : respirations are even and unlabored\par  [de-identified] : right breast incisions are well-approximated, c/d/i, with Dermabond in place.  There is moderate, generalized edema and mild ecchymosis.  No signs of wound dehiscence or fluctuance. Flap is soft, skin paddle pink with good capillary refill. Juan David drains are to self suction with serosanguineous fluid appreciated.\par ' [de-identified] : transverse abdominal and umbilical incisions are well-approximated with moderate, generalized edema.  Incisions are c/d/i with Prineo in place.  No signs of wound dehiscence or fluctuance.  Left Juan David drain is to self-suction with serosanguineous fluid present.\par

## 2023-02-16 NOTE — HISTORY OF PRESENT ILLNESS
[FreeTextEntry1] : Luh is a 62 year old female who presents for a postop evaluaiton.  She is accompanied by her cousin.  Patient is s/p right breast reconstruction with stacked HANS flap on 1/30/23.  She is healing well.  Denies any specific complaints or concerns.  Drain outputs < 25 ml over past 24 hours.

## 2023-02-22 ENCOUNTER — APPOINTMENT (OUTPATIENT)
Dept: BREAST CENTER | Facility: CLINIC | Age: 63
End: 2023-02-22
Payer: COMMERCIAL

## 2023-02-22 ENCOUNTER — RESULT REVIEW (OUTPATIENT)
Age: 63
End: 2023-02-22

## 2023-02-22 VITALS
HEIGHT: 60 IN | HEART RATE: 93 BPM | DIASTOLIC BLOOD PRESSURE: 89 MMHG | BODY MASS INDEX: 21.6 KG/M2 | SYSTOLIC BLOOD PRESSURE: 133 MMHG | WEIGHT: 110 LBS

## 2023-02-22 DIAGNOSIS — Z90.11 ACQUIRED ABSENCE OF RIGHT BREAST AND NIPPLE: ICD-10-CM

## 2023-02-22 PROCEDURE — 99024 POSTOP FOLLOW-UP VISIT: CPT

## 2023-02-22 NOTE — PHYSICAL EXAM
[de-identified] : Circular skin island with vertical incision 6:00 healing well. Residual glue removed. [de-identified] : Lower incision healing well.

## 2023-02-22 NOTE — DATA REVIEWED
[FreeTextEntry1] : Bilateral mammogram 12/30/2021:  Focal heterogeneous calcifications posterior right upper outer quadrant and medial likely inferior breast, rec additional imaging.\par \par Bilateral breast ultrasound 12/30/2021:  Right 8N5 7 mm septated cyst. 9N8 10b4m32 mm irregular mass, rec targeted ultrasound\par \par Right mammogram 2/1/2022:  Scattered calcifications posterior UOQ associated with nodular mass suggestive of degenerating fibroadenoma.  Follow-up in 6 months.\par \par Right breast ultrasound 2/1/2022:  9 N8 mildly lobulated mass 45h1x57 mm with some cystic foci.\par \par Right mammogram 8/17/2022:  Previous coarse calcifications with vague nodular mass deep posterior UOQ.  Few additional calcifications some related to coarsening of pre-existing faint calcifications.  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  Subsegmental linear and clumped nonmass enhancement right upper outer quadrant and right lower outer quadrant spanning from posterior third to retroareolar region 01z27l15 cm.  MRI biopsy of anterior aspect can be performed if breast conservation a consideration.\par \par Pathology MRI biopsy 9/27/2022:  DCIS cribriform, micropapillary and papillary, intermediate grade, ER 90%\par \par Surgical pathology 1/30/2023:  Right mastectomy- 2 sites of DCIS, micropapillary and cribriform with intermediate to high nuclear grade and necrosis, margins negative, SLN x2 negative

## 2023-02-22 NOTE — HISTORY OF PRESENT ILLNESS
[FreeTextEntry1] : This is a 62 year old  female who was found to have right breast calcifications on a routine mammogram in December 2021 for which a six month follow-up was advised.  The follow-up was done in August.  A biopsy was advised and showed DCIS.  Her Gynecologist referred [place] [facility] [place] where an MRI showed a more extensive abnormality.  MRI biopsy of a second site also showed DCIS. \par \par She had a previous benign biopsy of her left breast in 2002.\par \par She opted to have a right mastectomy with HANS reconstruction on 1/30/2023 in conjunction with Dr. Wing.  She is doing well.  She stopped taking pain medication and has some mild pain on the right side of the breast.  \par \par Pathology shows 2 sites on DCIS.  Margins are negative and SLN x2 are negative.

## 2023-03-10 ENCOUNTER — APPOINTMENT (OUTPATIENT)
Dept: PLASTIC SURGERY | Facility: CLINIC | Age: 63
End: 2023-03-10
Payer: COMMERCIAL

## 2023-03-10 VITALS
BODY MASS INDEX: 21.6 KG/M2 | RESPIRATION RATE: 16 BRPM | DIASTOLIC BLOOD PRESSURE: 84 MMHG | WEIGHT: 110 LBS | OXYGEN SATURATION: 98 % | HEIGHT: 60 IN | HEART RATE: 82 BPM | SYSTOLIC BLOOD PRESSURE: 132 MMHG

## 2023-03-10 DIAGNOSIS — Z90.13 ACQUIRED ABSENCE OF BILATERAL BREASTS AND NIPPLES: ICD-10-CM

## 2023-03-10 PROCEDURE — 99024 POSTOP FOLLOW-UP VISIT: CPT

## 2023-03-10 NOTE — HISTORY OF PRESENT ILLNESS
[FreeTextEntry1] : Luh is a 62 year old female who presents for a postop evaluation. Patient is s/p right breast reconstruction with stacked HANS flap on 1/30/23. She is healing well. Reports some lower back tightness.  Referred for PT by Dr Murillo for right upper extremity ROM. Reports small spot of discharge on her bra yesterday. Denies pain.

## 2023-03-10 NOTE — PHYSICAL EXAM
[de-identified] : alert, calm, cooperative\par  [de-identified] : respirations are even and unlabored\par  [de-identified] : right breast incisions are well-approximated, c/d/i, with small area of superficial wound at the superior aspect of the vertical incision.  There is mild generalized edema, which has improved. No signs of wound dehiscence or fluctuance. Flap is soft, skin paddle pink with good capillary refill. [de-identified] : transverse abdominal and umbilical incisions are healing well with mild, generalized edema.  Incisions are c/d/i open to air.  No signs of wound dehiscence or fluctuance.  \par  [de-identified] : lower back non-tender upon palpation. [de-identified] : Good ROM of the right upper extremity

## 2023-08-03 ENCOUNTER — RESULT REVIEW (OUTPATIENT)
Age: 63
End: 2023-08-03

## 2023-08-03 ENCOUNTER — APPOINTMENT (OUTPATIENT)
Dept: ULTRASOUND IMAGING | Facility: CLINIC | Age: 63
End: 2023-08-03
Payer: MEDICARE

## 2023-08-03 ENCOUNTER — OUTPATIENT (OUTPATIENT)
Dept: OUTPATIENT SERVICES | Facility: HOSPITAL | Age: 63
LOS: 1 days | End: 2023-08-03
Payer: MEDICARE

## 2023-08-03 ENCOUNTER — APPOINTMENT (OUTPATIENT)
Dept: MAMMOGRAPHY | Facility: CLINIC | Age: 63
End: 2023-08-03
Payer: MEDICARE

## 2023-08-03 DIAGNOSIS — D05.11 INTRADUCTAL CARCINOMA IN SITU OF RIGHT BREAST: ICD-10-CM

## 2023-08-03 DIAGNOSIS — Z98.890 OTHER SPECIFIED POSTPROCEDURAL STATES: Chronic | ICD-10-CM

## 2023-08-03 PROCEDURE — 77065 DX MAMMO INCL CAD UNI: CPT | Mod: 26,LT

## 2023-08-03 PROCEDURE — 76641 ULTRASOUND BREAST COMPLETE: CPT | Mod: 26,LT,GZ

## 2023-08-03 PROCEDURE — G0279: CPT

## 2023-08-03 PROCEDURE — G0279: CPT | Mod: 26

## 2023-08-03 PROCEDURE — 76641 ULTRASOUND BREAST COMPLETE: CPT

## 2023-08-03 PROCEDURE — 77065 DX MAMMO INCL CAD UNI: CPT

## 2023-08-22 NOTE — PHYSICAL EXAM
[de-identified] : right breast flap soft, nt. healing well. incisions c/d/i and healing well. flap viable. drain held to suction with SS output. dressing changed.  [de-identified] : abdomen soft, nt. incisions c/d/i and healing well. no colleciton or infection. JAYDEN drains held to suction with SS output. dressing changed.  [de-identified] : good ROM

## 2023-08-22 NOTE — HISTORY OF PRESENT ILLNESS
[FreeTextEntry1] : 62 year old  female with history of right breast DCIS who is s/p right mastectomy with stacked HANS flap reconstruction on 23. She reports doing well and is without any complaints. JAYDEN drain outputs reviewed. \par \par PMHx: Sjorgren's syndrome,\par SHx: left elbow fracture repair \par

## 2023-08-22 NOTE — PHYSICAL EXAM
[de-identified] : right breast flap soft, nt. healing well. incisions c/d/i and healing well. flap viable. drain held to suction with SS output. dressing changed.  [de-identified] : abdomen soft, nt. incisions c/d/i and healing well. no collection or infection. JAYDEN drain held to suction with minimal SS output. JAYDEN drain dc'ed and dressing placed with bacitracin + gauze dressing. other JAYDEN drain held to suction and kept in place. dressing changed.  [de-identified] : good ROM b/l UE

## 2023-08-22 NOTE — HISTORY OF PRESENT ILLNESS
[FreeTextEntry1] : 62 year old  female with history of right breast DCIS who is s/p right mastectomy with stacked HANS flap reconstruction on 23. She reports doing well and is without any complaints. JAYDEN drain output reviewed. \par \par PMHx: Sjorgren's syndrome,\par SHx: left elbow fracture repair \par

## 2023-08-30 ENCOUNTER — APPOINTMENT (OUTPATIENT)
Dept: BREAST CENTER | Facility: CLINIC | Age: 63
End: 2023-08-30
Payer: MEDICARE

## 2023-08-30 ENCOUNTER — LABORATORY RESULT (OUTPATIENT)
Age: 63
End: 2023-08-30

## 2023-08-30 VITALS
BODY MASS INDEX: 21.2 KG/M2 | HEIGHT: 60 IN | SYSTOLIC BLOOD PRESSURE: 126 MMHG | HEART RATE: 74 BPM | WEIGHT: 108 LBS | DIASTOLIC BLOOD PRESSURE: 86 MMHG

## 2023-08-30 DIAGNOSIS — Z09 ENCOUNTER FOR FOLLOW-UP EXAMINATION AFTER COMPLETED TREATMENT FOR CONDITIONS OTHER THAN MALIGNANT NEOPLASM: ICD-10-CM

## 2023-08-30 DIAGNOSIS — Z01.818 ENCOUNTER FOR OTHER PREPROCEDURAL EXAMINATION: ICD-10-CM

## 2023-08-30 PROCEDURE — 10005 FNA BX W/US GDN 1ST LES: CPT

## 2023-08-30 PROCEDURE — 76642 ULTRASOUND BREAST LIMITED: CPT | Mod: RT

## 2023-08-30 PROCEDURE — 99213 OFFICE O/P EST LOW 20 MIN: CPT

## 2023-08-30 RX ORDER — CLOTRIMAZOLE 10 MG/1
10 LOZENGE ORAL
Refills: 0 | Status: DISCONTINUED | COMMUNITY
End: 2023-08-30

## 2023-08-30 NOTE — PROCEDURE
[FreeTextEntry1] : Right breast ultrasound and ultrasound guided FNA [FreeTextEntry2] : Assess palpable abnormality [FreeTextEntry3] : The right upper 11:00 breast was imaged and shows a hyperechoic oval structure with several internal hypoechoic oval areas most c/w fat necrosis 95i66p9 mm.  However, as it is somewhat more superior than the flap, an FNA was performed.  An ultrasound guided FNA was performed and a cytologic smear was made.  Oily material was withdrawn c/w an oil cyst/fat necrosis.

## 2023-08-30 NOTE — PHYSICAL EXAM
[EOMI] : extra ocular movement intact [Sclera nonicteric] : sclera nonicteric [Supple] : supple [No Supraclavicular Adenopathy] : no supraclavicular adenopathy [No Cervical Adenopathy] : no cervical adenopathy [No Thyromegaly] : no thyromegaly [Clear to Auscultation Bilat] : clear to auscultation bilaterally [Examined in the supine and seated position] : examined in the supine and seated position [No dominant masses] : no dominant masses left breast [No Nipple Retraction] : no left nipple retraction [No Nipple Discharge] : no left nipple discharge [No Axillary Lymphadenopathy] : no left axillary lymphadenopathy [Soft] : abdomen soft [Not Tender] : non-tender [de-identified] : Circular skin island with vertical scar 6:00.  1.5 cm flat mobile density 11:00 overlying lateral pectoral muscle. [de-identified] : Lower incision healing well.

## 2023-08-30 NOTE — CONSULT LETTER
[Dear  ___] : Dear  [unfilled], [Consult Letter:] : I had the pleasure of evaluating your patient, [unfilled]. [Sincerely,] : Sincerely, [DrJoshua  ___] : Dr. LEVINE

## 2023-08-30 NOTE — DATA REVIEWED
[FreeTextEntry1] : Left mammogram and breast ultrasound 8/3/2023 No evidence of malignancy.    Pathology MRI biopsy 9/27/2022:  DCIS cribriform, micropapillary and papillary, intermediate grade, ER 90%  Surgical pathology 1/30/2023:  Right mastectomy- 2 sites of DCIS, micropapillary and cribriform with intermediate to high nuclear grade and necrosis, margins negative, SLN x2 negative

## 2023-08-30 NOTE — HISTORY OF PRESENT ILLNESS
[FreeTextEntry1] : This is a 63 year old  female who was found to have right breast calcifications on a routine mammogram in December 2021 for which a six month follow-up was advised.  The follow-up was done in August.  A biopsy was advised and showed DCIS.  Her Gynecologist referred her [facility] [place] where an MRI showed a more extensive abnormality.  MRI biopsy of a second site also showed DCIS.   She had a previous benign biopsy of her left breast in 2002.  She opted to have a right mastectomy with HANS reconstruction on 1/30/2023 in conjunction with Dr. Wing.  Pathology showed 2 sites on DCIS.  Margins were negative and SLN x2 are negative.  She now feels a lump in the upper right breast.

## 2023-12-14 ENCOUNTER — APPOINTMENT (OUTPATIENT)
Dept: FAMILY MEDICINE | Facility: CLINIC | Age: 63
End: 2023-12-14
Payer: MEDICARE

## 2023-12-14 VITALS
DIASTOLIC BLOOD PRESSURE: 60 MMHG | SYSTOLIC BLOOD PRESSURE: 130 MMHG | OXYGEN SATURATION: 98 % | BODY MASS INDEX: 21.2 KG/M2 | WEIGHT: 108 LBS | TEMPERATURE: 98.3 F | HEART RATE: 44 BPM | HEIGHT: 60 IN

## 2023-12-14 DIAGNOSIS — R51.9 HEADACHE, UNSPECIFIED: ICD-10-CM

## 2023-12-14 DIAGNOSIS — S70.361A INSECT BITE (NONVENOMOUS), RIGHT THIGH, INITIAL ENCOUNTER: ICD-10-CM
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
## 2023-12-14 PROCEDURE — 99214 OFFICE O/P EST MOD 30 MIN: CPT

## 2023-12-14 RX ORDER — DOXYCYCLINE HYCLATE 100 MG/1
100 TABLET ORAL
Qty: 2 | Refills: 0 | Status: ACTIVE | COMMUNITY
Start: 2023-12-14 | End: 1900-01-01

## 2023-12-14 NOTE — HISTORY OF PRESENT ILLNESS
[FreeTextEntry8] : KYAW MELO is a 63 year old female presenting for tick bite.   Noted about 3 days prior in right inguinal region -pulled it off.  also noted another possible bite slightly lateral to first bite but no tick seen there.  admits to pruritis in area- states its typical when she gets tick bites  [place]

## 2023-12-14 NOTE — PLAN
[FreeTextEntry1] : f/u 3 weeks for BW (Lyme testing). Follow-up for CPE. Patient is also risk questing a neurology referral for management of her right-sided headache ongoing for 1 month.  States she also discussed with the rheumatologist who also felt that she should see a neurologist.

## 2023-12-14 NOTE — PHYSICAL EXAM
[Normal] : no acute distress, well nourished, well developed and well-appearing [de-identified] : excoriations and 2 possible tick bites

## 2023-12-29 ENCOUNTER — APPOINTMENT (OUTPATIENT)
Dept: NEUROSURGERY | Facility: CLINIC | Age: 63
End: 2023-12-29
Payer: MEDICARE

## 2023-12-29 VITALS
HEIGHT: 60 IN | OXYGEN SATURATION: 100 % | BODY MASS INDEX: 20.81 KG/M2 | SYSTOLIC BLOOD PRESSURE: 135 MMHG | WEIGHT: 106 LBS | DIASTOLIC BLOOD PRESSURE: 82 MMHG | HEART RATE: 81 BPM

## 2023-12-29 VITALS
WEIGHT: 106 LBS | OXYGEN SATURATION: 100 % | BODY MASS INDEX: 20.81 KG/M2 | SYSTOLIC BLOOD PRESSURE: 135 MMHG | DIASTOLIC BLOOD PRESSURE: 82 MMHG | HEART RATE: 81 BPM | HEIGHT: 60 IN

## 2023-12-29 DIAGNOSIS — Z72.3 LACK OF PHYSICAL EXERCISE: ICD-10-CM

## 2023-12-29 DIAGNOSIS — Z78.9 OTHER SPECIFIED HEALTH STATUS: ICD-10-CM

## 2023-12-29 DIAGNOSIS — R20.2 PARESTHESIA OF SKIN: ICD-10-CM

## 2023-12-29 PROCEDURE — 99204 OFFICE O/P NEW MOD 45 MIN: CPT

## 2024-01-04 ENCOUNTER — APPOINTMENT (OUTPATIENT)
Dept: FAMILY MEDICINE | Facility: CLINIC | Age: 64
End: 2024-01-04
Payer: MEDICARE

## 2024-01-04 ENCOUNTER — NON-APPOINTMENT (OUTPATIENT)
Age: 64
End: 2024-01-04

## 2024-01-04 VITALS
BODY MASS INDEX: 21.2 KG/M2 | SYSTOLIC BLOOD PRESSURE: 120 MMHG | WEIGHT: 108 LBS | HEIGHT: 60 IN | DIASTOLIC BLOOD PRESSURE: 70 MMHG | TEMPERATURE: 98 F | HEART RATE: 83 BPM | OXYGEN SATURATION: 98 %

## 2024-01-04 DIAGNOSIS — S70.361S: ICD-10-CM
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
## 2024-01-04 DIAGNOSIS — Z00.00 ENCOUNTER FOR GENERAL ADULT MEDICAL EXAMINATION W/OUT ABNORMAL FINDINGS: ICD-10-CM

## 2024-01-04 DIAGNOSIS — M85.80 OTHER SPECIFIED DISORDERS OF BONE DENSITY AND STRUCTURE, UNSPECIFIED SITE: ICD-10-CM

## 2024-01-04 PROCEDURE — 81003 URINALYSIS AUTO W/O SCOPE: CPT | Mod: QW

## 2024-01-04 PROCEDURE — 93000 ELECTROCARDIOGRAM COMPLETE: CPT | Mod: 59

## 2024-01-04 PROCEDURE — 99173 VISUAL ACUITY SCREEN: CPT | Mod: GY

## 2024-01-04 PROCEDURE — G0402 INITIAL PREVENTIVE EXAM: CPT

## 2024-01-04 PROCEDURE — 36415 COLL VENOUS BLD VENIPUNCTURE: CPT

## 2024-01-04 NOTE — HEALTH RISK ASSESSMENT
[Yes] : Yes [2 - 4 times a month (2 pts)] : 2-4 times a month (2 points) [1 or 2 (0 pts)] : 1 or 2 (0 points) [Never (0 pts)] : Never (0 points) [No] : In the past 12 months have you used drugs other than those required for medical reasons? No [No falls in past year] : Patient reported no falls in the past year [0] : 1) Little interest or pleasure doing things: Not at all (0) [1] : 2) Feeling down, depressed, or hopeless for several days (1) [PHQ-2 Negative - No further assessment needed] : PHQ-2 Negative - No further assessment needed [Patient reported mammogram was normal] : Patient reported mammogram was normal [Patient reported bone density results were normal] : Patient reported bone density results were normal [HIV Test offered] : HIV Test offered [Hepatitis C test offered] : Hepatitis C test offered [Alone] : lives alone [Retired] : retired [Graduate School] : graduate school [Single] : single [Feels Safe at Home] : Feels safe at home [Fully functional (bathing, dressing, toileting, transferring, walking, feeding)] : Fully functional (bathing, dressing, toileting, transferring, walking, feeding) [Fully functional (using the telephone, shopping, preparing meals, housekeeping, doing laundry, using] : Fully functional and needs no help or supervision to perform IADLs (using the telephone, shopping, preparing meals, housekeeping, doing laundry, using transportation, managing medications and managing finances) [Reports changes in vision] : Reports changes in vision [Reports normal functional visual acuity (ie: able to read med bottle)] : Reports normal functional visual acuity [Smoke Detector] : smoke detector [Carbon Monoxide Detector] : carbon monoxide detector [Seat Belt] :  uses seat belt [Sunscreen] : uses sunscreen [Never] : Never [Audit-CScore] : 2 [[id number]] : 1 [Change in mental status noted] : No change in mental status noted [Language] : denies difficulty with language [Behavior] : denies difficulty with behavior [Learning/Retaining New Information] : denies difficulty learning/retaining new information [Handling Complex Tasks] : denies difficulty handling complex tasks [Reasoning] : denies difficulty with reasoning [Spatial Ability and Orientation] : denies difficulty with spatial ability and orientation [Sexually Active] : not sexually active [Reports changes in hearing] : Reports no changes in hearing [Reports changes in dental health] : Reports no changes in dental health [Guns at Home] : no guns at home [Travel to Developing Areas] : does not  travel to developing areas [TB Exposure] : is not being exposed to tuberculosis [Caregiver Concerns] : does not have caregiver concerns [MammogramDate] : 08/23 [PapSmearComments] : unsure [BoneDensityDate] : 12/23 [ColonoscopyComments] : unsure

## 2024-01-04 NOTE — PLAN
[FreeTextEntry1] : Blood work done in office today. Will follow up on results with patient. Extensive counseling provided on lifestyle modifications (healthy diet, daily exercise, routine screenings).  Return for CPE in 1 year.  Hx of pap screening refusal due to discomfort.  recently had mammogram done (right breast mastectomy) not due for colonoscopy yet per patient  will refer for eye exam recommend routine skin checks will do lyme testing for tick bite 3 weeks ago  dexa done 12/2023

## 2024-01-05 PROBLEM — Z72.3 DOES NOT EXERCISE: Status: ACTIVE | Noted: 2022-10-26

## 2024-01-05 PROBLEM — Z78.9 CAFFEINE USE: Status: ACTIVE | Noted: 2022-10-26

## 2024-01-05 NOTE — CONSULT LETTER
[Dear  ___] : Dear  [unfilled], [Courtesy Letter:] : I had the pleasure of seeing your patient, [unfilled], in my office today. 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  The patient endorses a 3-month history of right facial tingling that radiates into the parietal region of her head into the ear and the lower aspect of the jaw.  The patient denies any injury or trauma.  The symptoms occurred spontaneously and are intermittent throughout the day.  She reports no pain.  Her hearing is normal.  She has no trouble eating or combing her hair.  There is no loss of sensation on the right side.  These paresthesia's awaken the patient through the night.  She does have chronic posterior neck pain with no arm radicular component.  The patient denies any coordination issues of her upper extremities or lower extremities.  Her gait is intact.  The symptoms are not reported as a headache.  The patient is except for get history for breast cancer on the right side that was diagnosed in August 2022.  Right breast Ca DCIS was diagnosed she did not require any chemotherapy or radiation post mastectomy surgery.  The patient has an underlying history of Sjogrens disease.  The patient has not attempted any medication OTC for these symptoms.    A cervical MRI from [facility] [place] was ordered by a different provider and completed and this image is not available to review.  The patient was instructed to obtain these images and bring the disc to the next office visit.   On neurologic examination, the patient is alert and oriented.  Appears well and in no distress.  Speech clear and fluent.  CN intact.  Pupils equal and reactive.  No saccades and no nystagmus.  Visual fields full to confrontation.  No ptosis.  Face sensation intact and symmetrical.  Hearing normal.  Tongue is midline and moves in all directions and does not deviate.  There was pain upon turning her head to the right side.  No vibration, temperature, or sensory loss throughout and in the V1, V2 and V3 distribution.  No pronator drift.  No dysmetria of UE and LE.  Full strength in all muscle groups.  Reflexes are 2+ in all extremities and equal and symmetric throughout.  No Mckeon sign bilaterally. Plantar reflexes are flexor.  SAEED is intact.  No abnormal extraneous movements.  No clonus.  Romberg is absent.   Gait is steady. Tandem gait normal. Able to walk on heels and toes without difficulty.   The patients clinical symptoms appear to be trigeminal neuralgia but do not completely fit the classic picture of this syndrome.  I have ordered a FIESTA MRI of the brain with contrast to clearly identify any right trigeminal nerve findings.  In addition, any lesion would be identified on this image as well that may be connected to the patient's breast Ca in the recent past.   I have requested the patient return to the office once the image of the brain is complete for review and the cervical MRI disc from [facility] will be brought to the office as well.   I discussed in detail the possible neuropathic medications that could be prescribed including Neurontin and Lyrica, but the patient did defer this for now.  The patient requests to be evaluated by Dr. Sheffield.    Thank you for very kindly including me in the evaluation and treatment of your patient.  Please do not hesitate to contact me should you have any concerns or questions regarding this evaluation or proposed follow-up treatment and evaluation plan.  [FreeTextEntry3] : Kimberly Lombardo, DNP, NP Nurse Practitioner Neurosurgery and Spine [facility] [place] Assistant  [employer] [facility] [employer] email: [email or web address]  [address] P- [phone] F- [phone]

## 2024-01-05 NOTE — REASON FOR VISIT
[New Patient Visit] : a new patient visit [Other: _____] : [unfilled] [FreeTextEntry1] : Facial tingling and radiates to the jaw

## 2024-01-06 ENCOUNTER — TRANSCRIPTION ENCOUNTER (OUTPATIENT)
Age: 64
End: 2024-01-06

## 2024-01-06 LAB
25(OH)D3 SERPL-MCNC: 33.8 NG/ML
ALBUMIN SERPL ELPH-MCNC: 4.6 G/DL
ALP BLD-CCNC: 75 U/L
ALT SERPL-CCNC: 25 U/L
ANION GAP SERPL CALC-SCNC: 13 MMOL/L
APPEARANCE: CLEAR
AST SERPL-CCNC: 30 U/L
B BURGDOR AB SER-IMP: NEGATIVE
B BURGDOR IGG+IGM SER QL: 0.07 INDEX
BACTERIA: NEGATIVE /HPF
BILIRUB SERPL-MCNC: 0.3 MG/DL
BILIRUB UR QL STRIP: NORMAL
BILIRUBIN URINE: NEGATIVE
BLOOD URINE: NEGATIVE
BUN SERPL-MCNC: 20 MG/DL
CALCIUM SERPL-MCNC: 9.5 MG/DL
CAST: 0 /LPF
CHLORIDE SERPL-SCNC: 103 MMOL/L
CHOLEST SERPL-MCNC: 261 MG/DL
CLARITY UR: CLEAR
CO2 SERPL-SCNC: 26 MMOL/L
COLLECTION METHOD: NORMAL
COLOR: YELLOW
CREAT SERPL-MCNC: 0.82 MG/DL
EGFR: 79 ML/MIN/1.73M2
EPITHELIAL CELLS: 5 /HPF
ESTIMATED AVERAGE GLUCOSE: 111 MG/DL
GLUCOSE QUALITATIVE U: NEGATIVE MG/DL
GLUCOSE SERPL-MCNC: 94 MG/DL
GLUCOSE UR-MCNC: NORMAL
HBA1C MFR BLD HPLC: 5.5 %
HCG UR QL: 0.2 EU/DL
HCT VFR BLD CALC: 42.9 %
HCV AB SER QL: NONREACTIVE
HCV S/CO RATIO: 0.18 S/CO
HDLC SERPL-MCNC: 107 MG/DL
HGB BLD-MCNC: 13.4 G/DL
HGB UR QL STRIP.AUTO: NORMAL
HIV1+2 AB SPEC QL IA.RAPID: NONREACTIVE
KETONES UR-MCNC: NORMAL
KETONES URINE: NEGATIVE MG/DL
LDLC SERPL CALC-MCNC: 144 MG/DL
LEUKOCYTE ESTERASE UR QL STRIP: NORMAL
LEUKOCYTE ESTERASE URINE: NEGATIVE
MCHC RBC-ENTMCNC: 28.3 PG
MCHC RBC-ENTMCNC: 31.2 GM/DL
MCV RBC AUTO: 90.5 FL
MICROSCOPIC-UA: NORMAL
NITRITE UR QL STRIP: NORMAL
NITRITE URINE: NEGATIVE
NONHDLC SERPL-MCNC: 154 MG/DL
PH UR STRIP: 5
PH URINE: 5.5
PLATELET # BLD AUTO: 178 K/UL
POTASSIUM SERPL-SCNC: 4.8 MMOL/L
PROT SERPL-MCNC: 7.2 G/DL
PROT UR STRIP-MCNC: NORMAL
PROTEIN URINE: NEGATIVE MG/DL
RBC # BLD: 4.74 M/UL
RBC # FLD: 13.2 %
RED BLOOD CELLS URINE: 0 /HPF
SODIUM SERPL-SCNC: 142 MMOL/L
SP GR UR STRIP: 1.03
SPECIFIC GRAVITY URINE: 1.02
T3FREE SERPL-MCNC: 2.65 PG/ML
T4 FREE SERPL-MCNC: 1.2 NG/DL
TRIGL SERPL-MCNC: 64 MG/DL
TSH SERPL-ACNC: 1.3 UIU/ML
UROBILINOGEN URINE: 0.2 MG/DL
WBC # FLD AUTO: 3.38 K/UL
WHITE BLOOD CELLS URINE: 0 /HPF

## 2024-01-09 ENCOUNTER — APPOINTMENT (OUTPATIENT)
Dept: BREAST CENTER | Facility: CLINIC | Age: 64
End: 2024-01-09
Payer: MEDICARE

## 2024-01-09 VITALS
HEART RATE: 71 BPM | SYSTOLIC BLOOD PRESSURE: 118 MMHG | DIASTOLIC BLOOD PRESSURE: 80 MMHG | WEIGHT: 108 LBS | BODY MASS INDEX: 21.2 KG/M2 | HEIGHT: 60 IN

## 2024-01-09 DIAGNOSIS — N64.1 FAT NECROSIS OF BREAST: ICD-10-CM

## 2024-01-09 DIAGNOSIS — D05.11 INTRADUCTAL CARCINOMA IN SITU OF RIGHT BREAST: ICD-10-CM

## 2024-01-09 PROCEDURE — 99213 OFFICE O/P EST LOW 20 MIN: CPT

## 2024-01-11 ENCOUNTER — APPOINTMENT (OUTPATIENT)
Dept: MRI IMAGING | Facility: CLINIC | Age: 64
End: 2024-01-11
Payer: MEDICARE

## 2024-01-11 ENCOUNTER — OUTPATIENT (OUTPATIENT)
Dept: OUTPATIENT SERVICES | Facility: HOSPITAL | Age: 64
LOS: 1 days | End: 2024-01-11
Payer: MEDICARE

## 2024-01-11 DIAGNOSIS — Z98.890 OTHER SPECIFIED POSTPROCEDURAL STATES: Chronic | ICD-10-CM

## 2024-01-11 DIAGNOSIS — R20.2 PARESTHESIA OF SKIN: ICD-10-CM

## 2024-01-11 PROCEDURE — 70553 MRI BRAIN STEM W/O & W/DYE: CPT

## 2024-01-11 PROCEDURE — 70553 MRI BRAIN STEM W/O & W/DYE: CPT | Mod: 26,MH

## 2024-01-11 PROCEDURE — A9585: CPT

## 2024-02-09 ENCOUNTER — APPOINTMENT (OUTPATIENT)
Dept: NEUROSURGERY | Facility: CLINIC | Age: 64
End: 2024-02-09

## 2024-03-27 NOTE — ASU PATIENT PROFILE, ADULT - CAREGIVER
PAST SURGICAL HISTORY:  No significant past surgical history     No significant past surgical history      Yes

## 2024-05-02 ENCOUNTER — APPOINTMENT (OUTPATIENT)
Dept: OTOLARYNGOLOGY | Facility: CLINIC | Age: 64
End: 2024-05-02
Payer: MEDICARE

## 2024-05-02 VITALS — BODY MASS INDEX: 21.99 KG/M2 | WEIGHT: 112 LBS | HEIGHT: 60 IN

## 2024-05-02 PROCEDURE — 99204 OFFICE O/P NEW MOD 45 MIN: CPT

## 2024-05-02 RX ORDER — PILOCARPINE HYDROCHLORIDE 5 MG/1
5 TABLET, FILM COATED ORAL
Refills: 0 | Status: ACTIVE | COMMUNITY

## 2024-05-02 NOTE — REASON FOR VISIT
[Initial Consultation] : an initial consultation for [Ear Pain] : ear pain [FreeTextEntry2] : lump in jaw

## 2024-05-02 NOTE — ASSESSMENT
[FreeTextEntry1] : mri brain 1/11/24 lesion withing rt pterygoid hyperintense rt max retention cyst Sjogren syndrome rt parotid tail  mass pt to provide imaging of area- mri neck no mention parotid ct  neck ordered rec head and neck consult

## 2024-05-02 NOTE — HISTORY OF PRESENT ILLNESS
[de-identified] : co pressure rt ear and pain co lump under rt ear past 7 mo no pain w eating 1/24 mri brain dry mouth dx Sjogren and limited scleroderma

## 2024-05-02 NOTE — PHYSICAL EXAM
[Midline] : trachea located in midline position [Normal] : no rashes [de-identified] : rt parotid tail w 1.5 cm discrete mass

## 2024-05-02 NOTE — REVIEW OF SYSTEMS
[Ear Pain] : ear pain [Patient Intake Form Reviewed] : Patient intake form was reviewed [Negative] : Ear [de-identified] : right ear  [FreeTextEntry1] : tingling sensation on right side of head

## 2024-05-02 NOTE — CONSULT LETTER
[Consult Letter:] : I had the pleasure of evaluating your patient, [unfilled]. [Please see my note below.] : Please see my note below. [Consult Closing:] : Thank you very much for allowing me to participate in the care of this patient.  If you have any questions, please do not hesitate to contact me. [Sincerely,] : Sincerely, [FreeTextEntry1] : Dear Dr. CARMENCITA COLON,  Thank you for your kind referral. Please refer to my enclosed office notes for KYAW MELO . If there are any questions free to contact me. [FreeTextEntry3] : Guru Hernandez MD, FACS

## 2024-05-07 ENCOUNTER — APPOINTMENT (OUTPATIENT)
Dept: CT IMAGING | Facility: CLINIC | Age: 64
End: 2024-05-07
Payer: MEDICARE

## 2024-05-07 ENCOUNTER — OUTPATIENT (OUTPATIENT)
Dept: OUTPATIENT SERVICES | Facility: HOSPITAL | Age: 64
LOS: 1 days | End: 2024-05-07
Payer: MEDICARE

## 2024-05-07 DIAGNOSIS — K11.8 OTHER DISEASES OF SALIVARY GLANDS: ICD-10-CM

## 2024-05-07 DIAGNOSIS — Z98.890 OTHER SPECIFIED POSTPROCEDURAL STATES: Chronic | ICD-10-CM

## 2024-05-07 PROCEDURE — 70491 CT SOFT TISSUE NECK W/DYE: CPT | Mod: 26,MH

## 2024-05-07 PROCEDURE — 70491 CT SOFT TISSUE NECK W/DYE: CPT

## 2024-05-13 ENCOUNTER — APPOINTMENT (OUTPATIENT)
Dept: OTOLARYNGOLOGY | Facility: CLINIC | Age: 64
End: 2024-05-13
Payer: MEDICARE

## 2024-05-13 VITALS — WEIGHT: 112 LBS | BODY MASS INDEX: 21.99 KG/M2 | HEIGHT: 60 IN

## 2024-05-13 PROCEDURE — 38505 NEEDLE BIOPSY LYMPH NODES: CPT | Mod: RT

## 2024-05-13 PROCEDURE — 10021 FNA BX W/O IMG GDN 1ST LES: CPT | Mod: 59,RT

## 2024-05-13 PROCEDURE — 99213 OFFICE O/P EST LOW 20 MIN: CPT | Mod: 25

## 2024-05-13 NOTE — PROCEDURE
[FreeTextEntry2] : rt tail pariotid [FreeTextEntry3] : sandyo w epi .2 cc aspiration small amount mucinous fluid and blood

## 2024-05-13 NOTE — ASSESSMENT
[FreeTextEntry1] : aspiration small amount mucoid fluid rt parotid tail, heme  no material for biopsy analysis drained rt ear conchae comadone f/u re parotid 8 weeks

## 2024-05-13 NOTE — PHYSICAL EXAM
[Midline] : trachea located in midline position [Normal] : no rashes [de-identified] : rt parotid tail 1.2 cm discrete mass

## 2024-07-02 ENCOUNTER — APPOINTMENT (OUTPATIENT)
Dept: OTOLARYNGOLOGY | Facility: CLINIC | Age: 64
End: 2024-07-02
Payer: MEDICARE

## 2024-07-02 VITALS — HEIGHT: 60 IN | BODY MASS INDEX: 21.99 KG/M2 | WEIGHT: 112 LBS

## 2024-07-02 DIAGNOSIS — J31.2 CHRONIC PHARYNGITIS: ICD-10-CM

## 2024-07-02 PROBLEM — K11.8 PAROTID MASS: Status: ACTIVE | Noted: 2020-12-31

## 2024-07-02 PROBLEM — M35.00 SJOGREN'S SYNDROME, WITH UNSPECIFIED ORGAN INVOLVEMENT: Status: ACTIVE | Noted: 2021-01-21

## 2024-07-02 PROCEDURE — 99213 OFFICE O/P EST LOW 20 MIN: CPT | Mod: 25

## 2024-07-02 PROCEDURE — 31575 DIAGNOSTIC LARYNGOSCOPY: CPT

## 2024-07-15 ENCOUNTER — APPOINTMENT (OUTPATIENT)
Dept: OTOLARYNGOLOGY | Facility: CLINIC | Age: 64
End: 2024-07-15
Payer: MEDICARE

## 2024-07-15 VITALS
DIASTOLIC BLOOD PRESSURE: 92 MMHG | SYSTOLIC BLOOD PRESSURE: 146 MMHG | HEIGHT: 60 IN | WEIGHT: 112 LBS | BODY MASS INDEX: 21.99 KG/M2

## 2024-07-15 DIAGNOSIS — K11.8 OTHER DISEASES OF SALIVARY GLANDS: ICD-10-CM

## 2024-07-15 DIAGNOSIS — M35.00 SICCA SYNDROME, UNSPECIFIED: ICD-10-CM

## 2024-07-15 PROCEDURE — 99213 OFFICE O/P EST LOW 20 MIN: CPT

## 2024-08-06 ENCOUNTER — RESULT REVIEW (OUTPATIENT)
Age: 64
End: 2024-08-06

## 2024-08-06 ENCOUNTER — APPOINTMENT (OUTPATIENT)
Dept: ULTRASOUND IMAGING | Facility: CLINIC | Age: 64
End: 2024-08-06

## 2024-08-06 ENCOUNTER — OUTPATIENT (OUTPATIENT)
Dept: OUTPATIENT SERVICES | Facility: HOSPITAL | Age: 64
LOS: 1 days | End: 2024-08-06
Payer: MEDICARE

## 2024-08-06 ENCOUNTER — APPOINTMENT (OUTPATIENT)
Dept: MAMMOGRAPHY | Facility: CLINIC | Age: 64
End: 2024-08-06

## 2024-08-06 DIAGNOSIS — Z98.890 OTHER SPECIFIED POSTPROCEDURAL STATES: Chronic | ICD-10-CM

## 2024-08-06 DIAGNOSIS — D05.11 INTRADUCTAL CARCINOMA IN SITU OF RIGHT BREAST: ICD-10-CM

## 2024-08-06 DIAGNOSIS — N64.1 FAT NECROSIS OF BREAST: ICD-10-CM

## 2024-08-06 PROCEDURE — 76642 ULTRASOUND BREAST LIMITED: CPT | Mod: 26,LT

## 2024-08-06 PROCEDURE — 77065 DX MAMMO INCL CAD UNI: CPT | Mod: 26,LT

## 2024-08-06 PROCEDURE — G0279: CPT

## 2024-08-06 PROCEDURE — G0279: CPT | Mod: 26

## 2024-08-06 PROCEDURE — 77065 DX MAMMO INCL CAD UNI: CPT

## 2024-08-06 PROCEDURE — 76642 ULTRASOUND BREAST LIMITED: CPT

## 2024-08-21 ENCOUNTER — APPOINTMENT (OUTPATIENT)
Dept: BREAST CENTER | Facility: CLINIC | Age: 64
End: 2024-08-21
Payer: MEDICARE

## 2024-08-21 VITALS
SYSTOLIC BLOOD PRESSURE: 144 MMHG | DIASTOLIC BLOOD PRESSURE: 83 MMHG | HEART RATE: 65 BPM | HEIGHT: 60 IN | WEIGHT: 112 LBS | BODY MASS INDEX: 21.99 KG/M2

## 2024-08-21 DIAGNOSIS — D05.11 INTRADUCTAL CARCINOMA IN SITU OF RIGHT BREAST: ICD-10-CM

## 2024-08-21 DIAGNOSIS — N64.1 FAT NECROSIS OF BREAST: ICD-10-CM

## 2024-08-21 PROCEDURE — 76642 ULTRASOUND BREAST LIMITED: CPT | Mod: RT

## 2024-08-21 PROCEDURE — 99213 OFFICE O/P EST LOW 20 MIN: CPT | Mod: 25

## 2024-08-21 NOTE — PHYSICAL EXAM
[EOMI] : extra ocular movement intact [Sclera nonicteric] : sclera nonicteric [Supple] : supple [No Supraclavicular Adenopathy] : no supraclavicular adenopathy [No Cervical Adenopathy] : no cervical adenopathy [No Thyromegaly] : no thyromegaly [Clear to Auscultation Bilat] : clear to auscultation bilaterally [Examined in the supine and seated position] : examined in the supine and seated position [No dominant masses] : no dominant masses left breast [No Nipple Retraction] : no left nipple retraction [No Nipple Discharge] : no left nipple discharge [No Axillary Lymphadenopathy] : no left axillary lymphadenopathy [Soft] : abdomen soft [Not Tender] : non-tender [No Palpable Masses] : no abdominal mass palpated [de-identified] : Circular skin island with vertical scar 6:00.  5-6 mm mobile density 11:00 overlying lateral pectoral muscle. [de-identified] : Lower scar.

## 2024-08-21 NOTE — PROCEDURE
[FreeTextEntry1] : Right breast ultrasound  [FreeTextEntry2] : Assess palpable abnormality [FreeTextEntry3] : The right upper 11:00 breast was imaged and shows 2 small oval hypoechoic lesions 5x3 and 11x4 mm smaller than when initially seen in 8/23.

## 2024-08-21 NOTE — PHYSICAL EXAM
[EOMI] : extra ocular movement intact [Sclera nonicteric] : sclera nonicteric [Supple] : supple [No Supraclavicular Adenopathy] : no supraclavicular adenopathy [No Cervical Adenopathy] : no cervical adenopathy [No Thyromegaly] : no thyromegaly [Clear to Auscultation Bilat] : clear to auscultation bilaterally [Examined in the supine and seated position] : examined in the supine and seated position [No dominant masses] : no dominant masses left breast [No Nipple Retraction] : no left nipple retraction [No Nipple Discharge] : no left nipple discharge [No Axillary Lymphadenopathy] : no left axillary lymphadenopathy [Soft] : abdomen soft [Not Tender] : non-tender [No Palpable Masses] : no abdominal mass palpated [de-identified] : Circular skin island with vertical scar 6:00.  5-6 mm mobile density 11:00 overlying lateral pectoral muscle. [de-identified] : Lower scar.

## 2024-08-21 NOTE — DATA REVIEWED
[FreeTextEntry1] : Left mammogram and breast ultrasound 8/6/2024 No evidence of malignancy.  (Images reviewed.)  Pathology MRI biopsy 9/27/2022:  DCIS cribriform, micropapillary and papillary, intermediate grade, ER 90%  Surgical pathology 1/30/2023:  Right mastectomy- 2 sites of DCIS, micropapillary and cribriform with intermediate to high nuclear grade and necrosis, margins negative, SLN x2 negative

## 2024-11-15 ENCOUNTER — APPOINTMENT (OUTPATIENT)
Dept: NEUROSURGERY | Facility: CLINIC | Age: 64
End: 2024-11-15
Payer: MEDICARE

## 2024-11-15 VITALS
HEART RATE: 76 BPM | DIASTOLIC BLOOD PRESSURE: 80 MMHG | SYSTOLIC BLOOD PRESSURE: 136 MMHG | HEIGHT: 60 IN | WEIGHT: 115 LBS | OXYGEN SATURATION: 99 % | BODY MASS INDEX: 22.58 KG/M2

## 2024-11-15 DIAGNOSIS — R51.9 HEADACHE, UNSPECIFIED: ICD-10-CM

## 2024-11-15 DIAGNOSIS — M54.50 LOW BACK PAIN, UNSPECIFIED: ICD-10-CM

## 2024-11-15 DIAGNOSIS — M54.2 CERVICALGIA: ICD-10-CM

## 2024-11-15 PROCEDURE — 99215 OFFICE O/P EST HI 40 MIN: CPT

## 2024-11-19 ENCOUNTER — APPOINTMENT (OUTPATIENT)
Dept: FAMILY MEDICINE | Facility: CLINIC | Age: 64
End: 2024-11-19
Payer: MEDICARE

## 2024-11-19 VITALS
WEIGHT: 115 LBS | OXYGEN SATURATION: 96 % | DIASTOLIC BLOOD PRESSURE: 62 MMHG | HEART RATE: 78 BPM | BODY MASS INDEX: 22.58 KG/M2 | HEIGHT: 60 IN | TEMPERATURE: 98.6 F | SYSTOLIC BLOOD PRESSURE: 120 MMHG

## 2024-11-19 DIAGNOSIS — N61.0 MASTITIS WITHOUT ABSCESS: ICD-10-CM
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
## 2024-11-19 DIAGNOSIS — S20.162A INSECT BITE (NONVENOMOUS) OF BREAST, LEFT BREAST, INITIAL ENCOUNTER: ICD-10-CM

## 2024-11-19 PROBLEM — R51.9 FACIAL PAIN: Status: ACTIVE | Noted: 2024-11-19

## 2024-11-19 PROBLEM — M54.50 LUMBAGO: Status: ACTIVE | Noted: 2019-10-14

## 2024-11-19 PROCEDURE — 99214 OFFICE O/P EST MOD 30 MIN: CPT

## 2024-11-19 RX ORDER — SULFAMETHOXAZOLE AND TRIMETHOPRIM 800; 160 MG/1; MG/1
800-160 TABLET ORAL TWICE DAILY
Qty: 14 | Refills: 0 | Status: ACTIVE | COMMUNITY
Start: 2024-11-19 | End: 1900-01-01

## 2024-12-09 ENCOUNTER — APPOINTMENT (OUTPATIENT)
Dept: MRI IMAGING | Facility: CLINIC | Age: 64
End: 2024-12-09
Payer: MEDICARE

## 2024-12-09 ENCOUNTER — OUTPATIENT (OUTPATIENT)
Dept: OUTPATIENT SERVICES | Facility: HOSPITAL | Age: 64
LOS: 1 days | End: 2024-12-09
Payer: MEDICARE

## 2024-12-09 DIAGNOSIS — R20.2 PARESTHESIA OF SKIN: ICD-10-CM

## 2024-12-09 DIAGNOSIS — Z98.890 OTHER SPECIFIED POSTPROCEDURAL STATES: Chronic | ICD-10-CM

## 2024-12-09 DIAGNOSIS — R51.9 HEADACHE, UNSPECIFIED: ICD-10-CM

## 2024-12-09 PROCEDURE — 72156 MRI NECK SPINE W/O & W/DYE: CPT | Mod: 26,MH

## 2024-12-09 PROCEDURE — 70553 MRI BRAIN STEM W/O & W/DYE: CPT | Mod: 26,MH

## 2024-12-09 PROCEDURE — A9585: CPT

## 2024-12-09 PROCEDURE — 72156 MRI NECK SPINE W/O & W/DYE: CPT

## 2024-12-09 PROCEDURE — 70553 MRI BRAIN STEM W/O & W/DYE: CPT

## 2024-12-25 PROBLEM — F10.90 ALCOHOL USE: Status: ACTIVE | Noted: 2022-10-26

## 2025-01-02 ENCOUNTER — APPOINTMENT (OUTPATIENT)
Dept: NEUROSURGERY | Facility: CLINIC | Age: 65
End: 2025-01-02
Payer: MEDICARE

## 2025-01-02 VITALS
SYSTOLIC BLOOD PRESSURE: 144 MMHG | WEIGHT: 115 LBS | OXYGEN SATURATION: 100 % | DIASTOLIC BLOOD PRESSURE: 87 MMHG | BODY MASS INDEX: 22.58 KG/M2 | HEIGHT: 60 IN | HEART RATE: 81 BPM

## 2025-01-02 DIAGNOSIS — R51.9 HEADACHE, UNSPECIFIED: ICD-10-CM

## 2025-01-02 DIAGNOSIS — M35.00 SICCA SYNDROME, UNSPECIFIED: ICD-10-CM

## 2025-01-02 DIAGNOSIS — M41.9 SCOLIOSIS, UNSPECIFIED: ICD-10-CM

## 2025-01-02 DIAGNOSIS — J01.00 ACUTE MAXILLARY SINUSITIS, UNSPECIFIED: ICD-10-CM

## 2025-01-02 DIAGNOSIS — M54.2 CERVICALGIA: ICD-10-CM

## 2025-01-02 PROCEDURE — 99204 OFFICE O/P NEW MOD 45 MIN: CPT

## 2025-02-11 ENCOUNTER — APPOINTMENT (OUTPATIENT)
Dept: BREAST CENTER | Facility: CLINIC | Age: 65
End: 2025-02-11
Payer: MEDICARE

## 2025-02-11 VITALS
SYSTOLIC BLOOD PRESSURE: 144 MMHG | BODY MASS INDEX: 22.58 KG/M2 | HEIGHT: 60 IN | DIASTOLIC BLOOD PRESSURE: 85 MMHG | HEART RATE: 71 BPM | WEIGHT: 115 LBS

## 2025-02-11 DIAGNOSIS — N64.1 FAT NECROSIS OF BREAST: ICD-10-CM

## 2025-02-11 DIAGNOSIS — D05.11 INTRADUCTAL CARCINOMA IN SITU OF RIGHT BREAST: ICD-10-CM

## 2025-02-11 PROCEDURE — 76642 ULTRASOUND BREAST LIMITED: CPT | Mod: RT

## 2025-02-11 PROCEDURE — 99213 OFFICE O/P EST LOW 20 MIN: CPT

## 2025-05-21 NOTE — ASU PATIENT PROFILE, ADULT - REASON FOR ADMISSION, PROFILE
Thanks for coming to see Dr. Esteban at [facility] today! Please follow the instructions below as we discussed during your visit:    Start face wash benzoyl peroxide 4% and then apply clindamycin lotion    SUNSCREENS    Sun protection is an important part in decreasing our risk of skin cancers, protecting against age spots, and minimizing wrinkles.  We can actively participate in sun protection by avoiding excessive sun exposure and applying sunscreens.    We recommend daily use of sunscreens to face and tops of hands.  With outdoor activities, sunscreens are recommended to exposed body areas.  When applying sunscreen, be sure to use a full ounce (handful amount) to entire body 15-20 minutes prior to sun exposure.  Reapply every 2-3 hours depending on your activity.  Excessive sweating or activities which involve water require more frequent applications.    We recommend sunscreens which have both UVA and UVB protection.  Sun Protection factor (SPF) is a measurement of the UVB blockade.  A minimum SPF of 30 is advised.  When purchasing a sunscreen, look for a product which contains one of the following active ingredients:  Avobenzone, Titanium Dioxide, or Zinc Oxide.    Newer products, such as Aveeno and Neutrogena, contain stabilizers which allow sunscreens to work longer on your skin.  Mexoryl is another common stabilizer which is used in many cosmetic products on the market.    Suggested Sunscreens    Neutrogena Sunblock with Helioplex Stabilizer*  Aveeno Continuous Protection with Active Photobarrier Complex Stabilizer*  CeraVe Facial Sunscreen SPF 30*  Cetaphil Facial Sunscreen SPF 30*  Coppertone Shade Lotion*  Eucerin Everyday Protection SPF 30*  Vanicream Sunscreen+  La Roche Posay Sunscreen+  EltaMD Sunscreen+      For those with sensitive skin, we recommend using a product with just a physical blocker such as titanium dioxide or zinc oxide.  These can be found in Neutrogena Sensitive Skin Sunblock,  Vanicream Sunscreen Sensitive Skin, Blue Lizard Sensitive Skin and EltaMD UV Physical Sunscreen.    * Available at Eye[employer]te, B[employer] and Shave[employer]ic Available online or at specialty pharmacies    What to look for in your moles that are worrisome for melanoma?   Melanoma often looks like a brown or black mole or birthmark. But melanoma has features that make it different from normal moles and birthmarks. People can remember the abnormal features of melanoma by thinking of the letters A, B, C, D, and E (picture 1):  ?Asymmetry - One half can look different than the other half.  ?Border - It can have a jagged or uneven edge.  ?Color - It can have different colors.  ?Diameter - It is larger than the eraser on the end of a pencil.  ?Evolution - Its size, color, or shape can change over time.  Skin affected by melanoma can also bleed or become swollen, red, or crusty.  Many moles and birthmarks are normal and are not melanoma. But if you have a mole or birthmark that you think might be abnormal, show it to your doctor or nurse.     Can melanoma be prevented? -- You can help prevent melanoma by protecting your skin from the sun’s rays. Sun exposure and sunburn are a big cause of melanoma. To reduce the chance of getting melanoma, you can:  ?Stay out of the sun in the middle of the day (from 10 AM to 4 PM)  ?Wear sunscreen and reapply it often  ?Wear a wide-brimmed hat, long-sleeved shirt, or long pants  ?Not use tanning beds. They increase your risk of getting melanoma       Another thing to watch for in a mole is whether it is changing, or \"evolving.\" So remember:  A is for asymmetry  B is for border  C is for color  D is for diameter  E is for evolving    right breast mastectomy

## 2025-06-09 ENCOUNTER — APPOINTMENT (OUTPATIENT)
Dept: NEUROSURGERY | Facility: CLINIC | Age: 65
End: 2025-06-09

## 2025-06-09 VITALS
HEART RATE: 68 BPM | DIASTOLIC BLOOD PRESSURE: 87 MMHG | OXYGEN SATURATION: 100 % | WEIGHT: 115 LBS | SYSTOLIC BLOOD PRESSURE: 160 MMHG | BODY MASS INDEX: 22.58 KG/M2 | HEIGHT: 60 IN

## 2025-06-16 ENCOUNTER — APPOINTMENT (OUTPATIENT)
Dept: PHYSICAL MEDICINE AND REHAB | Facility: CLINIC | Age: 65
End: 2025-06-16
Payer: MEDICARE

## 2025-06-16 VITALS
OXYGEN SATURATION: 99 % | HEIGHT: 60 IN | SYSTOLIC BLOOD PRESSURE: 154 MMHG | WEIGHT: 115 LBS | HEART RATE: 66 BPM | DIASTOLIC BLOOD PRESSURE: 90 MMHG | BODY MASS INDEX: 22.58 KG/M2

## 2025-06-16 PROBLEM — M47.812 CERVICAL SPONDYLOSIS: Status: ACTIVE | Noted: 2025-06-16

## 2025-06-16 PROBLEM — M54.81 CERVICO-OCCIPITAL NEURALGIA: Status: ACTIVE | Noted: 2025-06-16

## 2025-06-16 PROBLEM — M79.18 CHRONIC MYOFASCIAL PAIN: Status: ACTIVE | Noted: 2025-06-16

## 2025-06-16 PROCEDURE — 99204 OFFICE O/P NEW MOD 45 MIN: CPT

## 2025-06-16 RX ORDER — METHYLPREDNISOLONE 4 MG/1
4 TABLET ORAL
Qty: 1 | Refills: 0 | Status: ACTIVE | COMMUNITY
Start: 2025-06-16 | End: 1900-01-01

## 2025-06-20 ENCOUNTER — APPOINTMENT (OUTPATIENT)
Dept: OTOLARYNGOLOGY | Facility: CLINIC | Age: 65
End: 2025-06-20
Payer: MEDICARE

## 2025-06-20 VITALS — HEIGHT: 60 IN | WEIGHT: 115 LBS | BODY MASS INDEX: 22.58 KG/M2

## 2025-06-20 PROBLEM — J34.89 NASAL VESTIBULITIS: Status: ACTIVE | Noted: 2025-06-20

## 2025-06-20 PROBLEM — J32.2 CHRONIC ETHMOIDAL SINUSITIS: Status: ACTIVE | Noted: 2025-06-20

## 2025-06-20 PROBLEM — J32.1 CHRONIC FRONTAL SINUSITIS: Status: ACTIVE | Noted: 2025-06-20

## 2025-06-20 PROCEDURE — 99213 OFFICE O/P EST LOW 20 MIN: CPT | Mod: 25

## 2025-06-20 PROCEDURE — 31231 NASAL ENDOSCOPY DX: CPT

## 2025-06-20 RX ORDER — DOXYCYCLINE 100 MG/1
100 CAPSULE ORAL TWICE DAILY
Qty: 28 | Refills: 1 | Status: ACTIVE | COMMUNITY
Start: 2025-06-20 | End: 1900-01-01

## 2025-06-20 RX ORDER — FLUTICASONE PROPIONATE 50 UG/1
50 SPRAY NASAL
Qty: 1 | Refills: 5 | Status: ACTIVE | COMMUNITY
Start: 2025-06-20 | End: 1900-01-01

## 2025-07-10 ENCOUNTER — OFFICE (OUTPATIENT)
Dept: URBAN - METROPOLITAN AREA CLINIC 109 | Facility: CLINIC | Age: 65
Setting detail: OPHTHALMOLOGY
End: 2025-07-10
Payer: MEDICARE

## 2025-07-10 DIAGNOSIS — H25.13: ICD-10-CM

## 2025-07-10 DIAGNOSIS — H52.7: ICD-10-CM

## 2025-07-10 DIAGNOSIS — H35.363: ICD-10-CM

## 2025-07-10 DIAGNOSIS — M35.00: ICD-10-CM

## 2025-07-10 PROCEDURE — 92015 DETERMINE REFRACTIVE STATE: CPT | Performed by: OPHTHALMOLOGY

## 2025-07-10 PROCEDURE — 92004 COMPRE OPH EXAM NEW PT 1/>: CPT | Performed by: OPHTHALMOLOGY

## 2025-07-10 PROCEDURE — 92201 OPSCPY EXTND RTA DRAW UNI/BI: CPT | Performed by: OPHTHALMOLOGY

## 2025-07-10 ASSESSMENT — TONOMETRY
OD_IOP_MMHG: 15
OS_IOP_MMHG: 19

## 2025-07-10 ASSESSMENT — REFRACTION_AUTOREFRACTION
OS_CYLINDER: -0.75
OD_CYLINDER: -1.75
OS_AXIS: 70
OD_AXIS: 93
OS_SPHERE: 0.00
OD_SPHERE: +1.75

## 2025-07-10 ASSESSMENT — KERATOMETRY
OD_K1POWER_DIOPTERS: 45.50
OD_K2POWER_DIOPTERS: 46.75
OS_AXISANGLE_DEGREES: 170
OS_K1POWER_DIOPTERS: 45.50
OD_AXISANGLE_DEGREES: 179
OS_K2POWER_DIOPTERS: 45.75

## 2025-07-10 ASSESSMENT — CONFRONTATIONAL VISUAL FIELD TEST (CVF)
OD_FINDINGS: FULL
OS_FINDINGS: FULL

## 2025-07-10 ASSESSMENT — REFRACTION_CURRENTRX
OS_SPHERE: +2.50
OS_VPRISM_DIRECTION: SV
OD_SPHERE: +2.50
OS_OVR_VA: 20/
OD_OVR_VA: 20/
OD_CYLINDER: SPH
OD_VPRISM_DIRECTION: SV
OS_CYLINDER: SPH

## 2025-07-10 ASSESSMENT — VISUAL ACUITY
OD_BCVA: 20/20-2
OS_BCVA: 20/20-3

## 2025-07-10 ASSESSMENT — REFRACTION_MANIFEST
OS_AXIS: 070
OD_CYLINDER: -0.75
OS_ADD: +2.50
OS_VA1: 20/20
OS_SPHERE: +0.50
OD_ADD: +2.50
OD_SPHERE: +1.00
OD_VA1: 20/20
OS_CYLINDER: -0.50
OD_AXIS: 093

## 2025-07-14 ENCOUNTER — APPOINTMENT (OUTPATIENT)
Dept: PHYSICAL MEDICINE AND REHAB | Facility: CLINIC | Age: 65
End: 2025-07-14

## 2025-07-16 ENCOUNTER — APPOINTMENT (OUTPATIENT)
Dept: FAMILY MEDICINE | Facility: CLINIC | Age: 65
End: 2025-07-16
Payer: MEDICARE

## 2025-07-16 VITALS
HEART RATE: 78 BPM | TEMPERATURE: 98.3 F | WEIGHT: 115 LBS | DIASTOLIC BLOOD PRESSURE: 80 MMHG | SYSTOLIC BLOOD PRESSURE: 118 MMHG | OXYGEN SATURATION: 98 % | BODY MASS INDEX: 22.46 KG/M2

## 2025-07-16 PROBLEM — R21 BULLOUS RASH: Status: ACTIVE | Noted: 2025-07-16

## 2025-07-16 PROBLEM — L29.89 PRURITIC ERYTHEMATOUS RASH: Status: ACTIVE | Noted: 2025-07-16

## 2025-07-16 PROCEDURE — 99214 OFFICE O/P EST MOD 30 MIN: CPT

## 2025-07-16 RX ORDER — HYDROCORTISONE 25 MG/G
2.5 CREAM TOPICAL TWICE DAILY
Qty: 1 | Refills: 0 | Status: ACTIVE | COMMUNITY
Start: 2025-07-16 | End: 1900-01-01

## 2025-07-17 ENCOUNTER — APPOINTMENT (OUTPATIENT)
Dept: DERMATOLOGY | Facility: CLINIC | Age: 65
End: 2025-07-17
Payer: MEDICARE

## 2025-07-17 ENCOUNTER — NON-APPOINTMENT (OUTPATIENT)
Age: 65
End: 2025-07-17

## 2025-07-17 PROBLEM — L13.9 BULLOUS ERUPTION, LOCALIZED: Status: ACTIVE | Noted: 2025-07-17

## 2025-07-17 PROBLEM — R23.8 BULLAE: Status: ACTIVE | Noted: 2025-07-17

## 2025-07-17 PROBLEM — L98.8 ARTHROPOD DERMATOSIS: Status: ACTIVE | Noted: 2025-07-17

## 2025-07-17 PROCEDURE — 99203 OFFICE O/P NEW LOW 30 MIN: CPT | Mod: 25

## 2025-07-17 PROCEDURE — 10140 I&D HMTMA SEROMA/FLUID COLLJ: CPT

## 2025-07-17 RX ORDER — CEVIMELINE HYDROCHLORIDE 30 MG/1
30 CAPSULE ORAL
Refills: 0 | Status: ACTIVE | COMMUNITY

## 2025-07-17 RX ORDER — BETAMETHASONE DIPROPIONATE 0.5 MG/G
0.05 OINTMENT, AUGMENTED TOPICAL
Qty: 1 | Refills: 0 | Status: ACTIVE | COMMUNITY
Start: 2025-07-17 | End: 1900-01-01

## 2025-07-17 RX ORDER — FAMOTIDINE 40 MG/1
40 TABLET, FILM COATED ORAL
Qty: 60 | Refills: 0 | Status: DISCONTINUED | COMMUNITY
Start: 2025-02-24

## 2025-08-14 ENCOUNTER — RESULT REVIEW (OUTPATIENT)
Age: 65
End: 2025-08-14

## 2025-08-14 ENCOUNTER — APPOINTMENT (OUTPATIENT)
Dept: MAMMOGRAPHY | Facility: CLINIC | Age: 65
End: 2025-08-14

## 2025-08-14 ENCOUNTER — OUTPATIENT (OUTPATIENT)
Dept: OUTPATIENT SERVICES | Facility: HOSPITAL | Age: 65
LOS: 1 days | End: 2025-08-14
Payer: MEDICARE

## 2025-08-14 ENCOUNTER — APPOINTMENT (OUTPATIENT)
Dept: ULTRASOUND IMAGING | Facility: CLINIC | Age: 65
End: 2025-08-14

## 2025-08-14 DIAGNOSIS — D05.11 INTRADUCTAL CARCINOMA IN SITU OF RIGHT BREAST: ICD-10-CM

## 2025-08-14 DIAGNOSIS — Z98.890 OTHER SPECIFIED POSTPROCEDURAL STATES: Chronic | ICD-10-CM

## 2025-08-14 PROCEDURE — G0279: CPT | Mod: 26

## 2025-08-14 PROCEDURE — 76641 ULTRASOUND BREAST COMPLETE: CPT | Mod: 26,LT,GA

## 2025-08-14 PROCEDURE — G0279: CPT

## 2025-08-14 PROCEDURE — 77065 DX MAMMO INCL CAD UNI: CPT

## 2025-08-14 PROCEDURE — 77065 DX MAMMO INCL CAD UNI: CPT | Mod: 26,LT

## 2025-08-14 PROCEDURE — 76641 ULTRASOUND BREAST COMPLETE: CPT

## 2025-08-19 ENCOUNTER — APPOINTMENT (OUTPATIENT)
Dept: BREAST CENTER | Facility: CLINIC | Age: 65
End: 2025-08-19
Payer: MEDICARE

## 2025-08-19 VITALS
WEIGHT: 116 LBS | SYSTOLIC BLOOD PRESSURE: 133 MMHG | HEIGHT: 60 IN | DIASTOLIC BLOOD PRESSURE: 81 MMHG | BODY MASS INDEX: 22.78 KG/M2 | HEART RATE: 73 BPM

## 2025-08-19 DIAGNOSIS — R92.30 DENSE BREASTS, UNSPECIFIED: ICD-10-CM

## 2025-08-19 DIAGNOSIS — Z90.11 ACQUIRED ABSENCE OF RIGHT BREAST AND NIPPLE: ICD-10-CM

## 2025-08-19 DIAGNOSIS — N64.1 FAT NECROSIS OF BREAST: ICD-10-CM

## 2025-08-19 DIAGNOSIS — D05.11 INTRADUCTAL CARCINOMA IN SITU OF RIGHT BREAST: ICD-10-CM

## 2025-08-19 PROCEDURE — 99213 OFFICE O/P EST LOW 20 MIN: CPT

## 2025-09-05 ENCOUNTER — APPOINTMENT (OUTPATIENT)
Dept: OTOLARYNGOLOGY | Facility: CLINIC | Age: 65
End: 2025-09-05
Payer: MEDICARE

## 2025-09-05 VITALS
HEART RATE: 73 BPM | HEIGHT: 60 IN | DIASTOLIC BLOOD PRESSURE: 81 MMHG | SYSTOLIC BLOOD PRESSURE: 133 MMHG | BODY MASS INDEX: 22.78 KG/M2 | WEIGHT: 116 LBS

## 2025-09-05 DIAGNOSIS — J32.1 CHRONIC FRONTAL SINUSITIS: ICD-10-CM

## 2025-09-05 PROCEDURE — 99214 OFFICE O/P EST MOD 30 MIN: CPT | Mod: 25

## 2025-09-05 PROCEDURE — 31231 NASAL ENDOSCOPY DX: CPT

## 2025-09-05 RX ORDER — AMOXICILLIN AND CLAVULANATE POTASSIUM 875; 125 MG/1; MG/1
875-125 TABLET, COATED ORAL TWICE DAILY
Qty: 20 | Refills: 0 | Status: ACTIVE | COMMUNITY
Start: 2025-09-05 | End: 1900-01-01

## 2025-09-05 RX ORDER — LORATADINE 5 MG/5 ML
0.05 SOLUTION, ORAL ORAL
Qty: 1 | Refills: 0 | Status: ACTIVE | COMMUNITY
Start: 2025-09-05 | End: 1900-01-01

## 2025-09-05 RX ORDER — FLUTICASONE PROPIONATE 50 UG/1
50 SPRAY NASAL DAILY
Qty: 1 | Refills: 3 | Status: ACTIVE | COMMUNITY
Start: 2025-09-05 | End: 1900-01-01

## 2025-09-05 RX ORDER — ELASTIC BANDAGE 1"X2.2YD
2300-700 BANDAGE TOPICAL
Qty: 1 | Refills: 0 | Status: ACTIVE | COMMUNITY
Start: 2025-09-05 | End: 1900-01-01